# Patient Record
Sex: MALE | Race: WHITE | Employment: FULL TIME | URBAN - NONMETROPOLITAN AREA
[De-identification: names, ages, dates, MRNs, and addresses within clinical notes are randomized per-mention and may not be internally consistent; named-entity substitution may affect disease eponyms.]

---

## 2017-08-22 ENCOUNTER — HOSPITAL ENCOUNTER (OUTPATIENT)
Dept: GENERAL RADIOLOGY | Age: 37
Discharge: HOME | End: 2017-08-22
Payer: COMMERCIAL

## 2017-08-22 ENCOUNTER — OFFICE VISIT (OUTPATIENT)
Dept: PRIMARY CARE CLINIC | Age: 37
End: 2017-08-22
Payer: COMMERCIAL

## 2017-08-22 VITALS
HEIGHT: 69 IN | OXYGEN SATURATION: 98 % | BODY MASS INDEX: 28.22 KG/M2 | SYSTOLIC BLOOD PRESSURE: 166 MMHG | HEART RATE: 66 BPM | DIASTOLIC BLOOD PRESSURE: 96 MMHG | TEMPERATURE: 99.6 F | WEIGHT: 190.5 LBS

## 2017-08-22 DIAGNOSIS — T07.XXXA MULTIPLE ABRASIONS: ICD-10-CM

## 2017-08-22 DIAGNOSIS — M25.519 SHOULDER PAIN, UNSPECIFIED CHRONICITY, UNSPECIFIED LATERALITY: Primary | ICD-10-CM

## 2017-08-22 DIAGNOSIS — R03.0 ELEVATED SYSTOLIC BLOOD PRESSURE READING WITHOUT DIAGNOSIS OF HYPERTENSION: ICD-10-CM

## 2017-08-22 DIAGNOSIS — M25.519 SHOULDER PAIN, UNSPECIFIED CHRONICITY, UNSPECIFIED LATERALITY: ICD-10-CM

## 2017-08-22 PROCEDURE — 99213 OFFICE O/P EST LOW 20 MIN: CPT | Performed by: NURSE PRACTITIONER

## 2017-08-22 PROCEDURE — 73030 X-RAY EXAM OF SHOULDER: CPT

## 2017-08-22 ASSESSMENT — ENCOUNTER SYMPTOMS
SORE THROAT: 0
DIARRHEA: 0
TROUBLE SWALLOWING: 0
RHINORRHEA: 0
VOMITING: 0
COUGH: 0
ABDOMINAL PAIN: 0
SHORTNESS OF BREATH: 0
CONSTIPATION: 0
NAUSEA: 0

## 2018-05-22 ENCOUNTER — OFFICE VISIT (OUTPATIENT)
Dept: PRIMARY CARE CLINIC | Age: 38
End: 2018-05-22
Payer: COMMERCIAL

## 2018-05-22 VITALS
SYSTOLIC BLOOD PRESSURE: 146 MMHG | HEART RATE: 61 BPM | WEIGHT: 210 LBS | BODY MASS INDEX: 31.83 KG/M2 | HEIGHT: 68 IN | OXYGEN SATURATION: 98 % | DIASTOLIC BLOOD PRESSURE: 94 MMHG | TEMPERATURE: 98.8 F

## 2018-05-22 DIAGNOSIS — Z00.00 ROUTINE PHYSICAL EXAMINATION: Primary | ICD-10-CM

## 2018-05-22 DIAGNOSIS — R53.83 FATIGUE, UNSPECIFIED TYPE: ICD-10-CM

## 2018-05-22 PROCEDURE — 99395 PREV VISIT EST AGE 18-39: CPT | Performed by: NURSE PRACTITIONER

## 2018-05-22 RX ORDER — CETIRIZINE HYDROCHLORIDE 10 MG/1
10 TABLET ORAL DAILY
COMMUNITY
End: 2020-11-11

## 2018-05-22 ASSESSMENT — ENCOUNTER SYMPTOMS
ABDOMINAL PAIN: 0
COUGH: 0
DIARRHEA: 0
RHINORRHEA: 0
SORE THROAT: 0
VOMITING: 0
CONSTIPATION: 0
SHORTNESS OF BREATH: 0
TROUBLE SWALLOWING: 0
NAUSEA: 0

## 2018-05-22 ASSESSMENT — PATIENT HEALTH QUESTIONNAIRE - PHQ9
SUM OF ALL RESPONSES TO PHQ9 QUESTIONS 1 & 2: 1
2. FEELING DOWN, DEPRESSED OR HOPELESS: 0
1. LITTLE INTEREST OR PLEASURE IN DOING THINGS: 1
SUM OF ALL RESPONSES TO PHQ QUESTIONS 1-9: 1

## 2018-05-25 DIAGNOSIS — Z00.00 ROUTINE PHYSICAL EXAMINATION: ICD-10-CM

## 2018-05-25 DIAGNOSIS — R53.83 FATIGUE, UNSPECIFIED TYPE: ICD-10-CM

## 2018-05-25 LAB
ALBUMIN SERPL-MCNC: 4.4 G/DL (ref 3.5–5.2)
ALP BLD-CCNC: 81 U/L (ref 40–130)
ALT SERPL-CCNC: 22 U/L (ref 5–41)
ANION GAP SERPL CALCULATED.3IONS-SCNC: 12 MMOL/L (ref 7–19)
AST SERPL-CCNC: 21 U/L (ref 5–40)
BASOPHILS ABSOLUTE: 0.1 K/UL (ref 0–0.2)
BASOPHILS RELATIVE PERCENT: 1.6 % (ref 0–1)
BILIRUB SERPL-MCNC: 0.4 MG/DL (ref 0.2–1.2)
BUN BLDV-MCNC: 15 MG/DL (ref 6–20)
CALCIUM SERPL-MCNC: 8.9 MG/DL (ref 8.6–10)
CHLORIDE BLD-SCNC: 104 MMOL/L (ref 98–111)
CHOLESTEROL, TOTAL: 167 MG/DL (ref 160–199)
CO2: 24 MMOL/L (ref 22–29)
CREAT SERPL-MCNC: 1 MG/DL (ref 0.5–1.2)
EOSINOPHILS ABSOLUTE: 0.6 K/UL (ref 0–0.6)
EOSINOPHILS RELATIVE PERCENT: 12.5 % (ref 0–5)
GFR NON-AFRICAN AMERICAN: >60
GLUCOSE BLD-MCNC: 102 MG/DL (ref 74–109)
HCT VFR BLD CALC: 41.9 % (ref 42–52)
HDLC SERPL-MCNC: 46 MG/DL (ref 55–121)
HEMOGLOBIN: 14.3 G/DL (ref 14–18)
LDL CHOLESTEROL CALCULATED: 103 MG/DL
LYMPHOCYTES ABSOLUTE: 2.1 K/UL (ref 1.1–4.5)
LYMPHOCYTES RELATIVE PERCENT: 40.2 % (ref 20–40)
MCH RBC QN AUTO: 32 PG (ref 27–31)
MCHC RBC AUTO-ENTMCNC: 34.1 G/DL (ref 33–37)
MCV RBC AUTO: 93.7 FL (ref 80–94)
MONOCYTES ABSOLUTE: 0.5 K/UL (ref 0–0.9)
MONOCYTES RELATIVE PERCENT: 9.4 % (ref 0–10)
NEUTROPHILS ABSOLUTE: 1.8 K/UL (ref 1.5–7.5)
NEUTROPHILS RELATIVE PERCENT: 36.1 % (ref 50–65)
PDW BLD-RTO: 12.7 % (ref 11.5–14.5)
PLATELET # BLD: 269 K/UL (ref 130–400)
PMV BLD AUTO: 10.7 FL (ref 9.4–12.4)
POTASSIUM SERPL-SCNC: 4.4 MMOL/L (ref 3.5–5)
RBC # BLD: 4.47 M/UL (ref 4.7–6.1)
SODIUM BLD-SCNC: 140 MMOL/L (ref 136–145)
T4 FREE: 1.3 NG/DL (ref 0.9–1.7)
TOTAL PROTEIN: 7.2 G/DL (ref 6.6–8.7)
TRIGL SERPL-MCNC: 90 MG/DL (ref 0–149)
TSH SERPL DL<=0.05 MIU/L-ACNC: 0.92 UIU/ML (ref 0.27–4.2)
WBC # BLD: 5.1 K/UL (ref 4.8–10.8)

## 2018-05-26 LAB
SEX HORMONE BINDING GLOBULIN: 40 NMOL/L (ref 11–80)
TESTOSTERONE FREE-NONMALE: 96.6 PG/ML (ref 47–244)
TESTOSTERONE TOTAL: 516 NG/DL (ref 220–1000)

## 2018-05-27 LAB — VITAMIN D 1,25-DIHYDROXY: 64.8 PG/ML (ref 19.9–79.3)

## 2018-10-12 ENCOUNTER — OFFICE VISIT (OUTPATIENT)
Dept: URGENT CARE | Age: 38
End: 2018-10-12
Payer: COMMERCIAL

## 2018-10-12 VITALS
DIASTOLIC BLOOD PRESSURE: 84 MMHG | TEMPERATURE: 97.8 F | BODY MASS INDEX: 32.23 KG/M2 | WEIGHT: 212 LBS | HEART RATE: 60 BPM | SYSTOLIC BLOOD PRESSURE: 132 MMHG | RESPIRATION RATE: 18 BRPM | OXYGEN SATURATION: 100 %

## 2018-10-12 DIAGNOSIS — J02.9 SORE THROAT: ICD-10-CM

## 2018-10-12 DIAGNOSIS — B08.4 HAND, FOOT AND MOUTH DISEASE: Primary | ICD-10-CM

## 2018-10-12 PROCEDURE — 99202 OFFICE O/P NEW SF 15 MIN: CPT | Performed by: SPECIALIST

## 2018-10-12 RX ORDER — AMOXICILLIN 500 MG/1
500 CAPSULE ORAL 3 TIMES DAILY
COMMUNITY
End: 2020-11-11

## 2018-10-12 ASSESSMENT — ENCOUNTER SYMPTOMS: SORE THROAT: 1

## 2018-10-12 NOTE — PROGRESS NOTES
Patient left without stopping, While I was attending to another patient. Did not collect copay for the visit.
and well-nourished. HENT:   Head: Normocephalic and atraumatic. Mouth/Throat: Uvula is midline and mucous membranes are normal. Posterior oropharyngeal erythema present. Tonsils are 2+ on the right. Tonsils are 2+ on the left. Cardiovascular: Normal rate, regular rhythm, S1 normal, S2 normal and normal heart sounds. Pulmonary/Chest: Effort normal and breath sounds normal.   Skin: Skin is warm and dry. Rash (hands) noted. Psychiatric: He has a normal mood and affect. His speech is normal and behavior is normal.   Nursing note and vitals reviewed. /84   Pulse 60   Temp 97.8 °F (36.6 °C) (Temporal)   Resp 18   Wt 212 lb (96.2 kg)   SpO2 100%   BMI 32.23 kg/m²     Assessment:       Diagnosis Orders   1. Hand, foot and mouth disease     2. Sore throat         Plan:    No orders of the defined types were placed in this encounter. No Follow-up on file. No orders of the defined types were placed in this encounter. No orders of the defined types were placed in this encounter. Patient given educationalmaterials - see patient instructions. Discussed use, benefit, and side effectsof prescribed medications. All patient questions answered. Pt voiced understanding. Reviewed health maintenance. Instructed to continue current medications, diet andexercise. Patient agreed with treatment plan. Follow up as directed. Patient Instructions       Patient Education        Stomatitis: Care Instructions  Your Care Instructions  Stomatitis is swelling and redness of the lining of your mouth. It can cause painful sores that can make it hard for you to eat, drink, or swallow. Stomatitis may be caused by a viral or bacterial infection, a disease, or not taking care of your teeth and gums properly. Other causes include reactions to smoking, burns from hot food or drinks, or an allergic reaction.  Allergy causes may be a result of flavorings such as spearmint or cinnamon that are used in

## 2018-10-12 NOTE — PATIENT INSTRUCTIONS
Patient Education        Stomatitis: Care Instructions  Your Care Instructions  Stomatitis is swelling and redness of the lining of your mouth. It can cause painful sores that can make it hard for you to eat, drink, or swallow. Stomatitis may be caused by a viral or bacterial infection, a disease, or not taking care of your teeth and gums properly. Other causes include reactions to smoking, burns from hot food or drinks, or an allergic reaction. Allergy causes may be a result of flavorings such as spearmint or cinnamon that are used in chewing gum, toothpaste, soft drinks, candies, and other products. Your doctor may prescribe pain medicines or special mouth rinses. He or she may tell you to quit using some products that may be causing the sores. It can take up to 2 weeks for the sores to heal.  Some people with stomatitis also get a yeast infection of the mouth, called thrush. Medicines can treat this problem. Follow-up care is a key part of your treatment and safety. Be sure to make and go to all appointments, and call your doctor if you are having problems. It's also a good idea to know your test results and keep a list of the medicines you take. How can you care for yourself at home? · Be safe with medicines. Read and follow all instructions on the label. ¨ If the doctor gave you a prescription medicine for pain, take it as prescribed. ¨ If you are not taking a prescription pain medicine, ask your doctor if you can take an over-the-counter medicine. · If your doctor prescribed antibiotics, take them as directed. Do not stop taking them just because you feel better. You need to take the full course of antibiotics. · Use prescription mouth rinses as prescribed. Ask your doctor if you can freeze the mouth rinse in an ice cube tray. Sucking on a frozen cube of the mouth rinse can help ease the pain. · Make a rinse to keep your mouth from getting dry.  Add 1 teaspoon baking soda and ½ teaspoon salt to a quart of water. Use it to rinse your mouth 4 to 6 times each day. Spit out the rinse. Do not swallow it. · Do not use a mouthwash or other over-the-counter rinse with alcohol. These can dry out your mouth or cause more pain. · If your doctor gave you mouthwash or lozenges to treat your yeast infection, use them as directed. · Eat soft foods such as mashed potatoes and other cooked vegetables, noodles, applesauce, clear broth soups, yogurt, and cottage cheese. You can get extra protein by adding protein powder to milk shakes or breakfast drinks. Avoid eating spicy or crunchy foods. · Try eating cold foods such as ice cream or yogurt. · Avoid drinking high-acid juices such as orange, grapefruit, and cranberry juices. · Drink plenty of fluids to prevent dehydration. Drinking through a straw may help with pain. · Practice good oral hygiene. Use a very soft toothbrush to brush your teeth at least two times a day. Or use your finger to brush your teeth if your mouth is sore. When should you call for help? Call your doctor now or seek immediate medical care if:    · You have new or worse symptoms of infection, such as:  ¨ Increased pain, swelling, warmth, or redness. ¨ Red streaks leading from the area. ¨ Pus draining from the area. ¨ A fever.    Watch closely for changes in your health, and be sure to contact your doctor if:    · You do not get better as expected. Where can you learn more? Go to https://[email or web address] [place]. org and sign in to your [employer] account. Enter B965 in the [facility] box to learn more about \"Stomatitis: Care Instructions. \"     If you do not have an account, please click on the \"Sign Up Now\" link. Current as of: May 12, 2017  Content Version: 11.7  © 5203-2025 [employer]. Care instructions adapted under license by [facility]  If you have questions about a medical condition or this instruction, always ask your healthcare professional.

## 2020-06-24 ENCOUNTER — TELEMEDICINE (OUTPATIENT)
Dept: PRIMARY CARE CLINIC | Age: 40
End: 2020-06-24
Payer: COMMERCIAL

## 2020-06-24 PROCEDURE — G0444 DEPRESSION SCREEN ANNUAL: HCPCS | Performed by: NURSE PRACTITIONER

## 2020-06-24 RX ORDER — LISINOPRIL 20 MG/1
20 TABLET ORAL DAILY
Qty: 30 TABLET | Refills: 11 | Status: SHIPPED | OUTPATIENT
Start: 2020-06-24 | End: 2020-07-23 | Stop reason: SDUPTHER

## 2020-06-24 RX ORDER — BUPROPION HYDROCHLORIDE 150 MG/1
150 TABLET ORAL EVERY MORNING
Qty: 30 TABLET | Refills: 11 | Status: SHIPPED | OUTPATIENT
Start: 2020-06-24 | End: 2020-07-23 | Stop reason: SDUPTHER

## 2020-06-24 ASSESSMENT — PATIENT HEALTH QUESTIONNAIRE - PHQ9
7. TROUBLE CONCENTRATING ON THINGS, SUCH AS READING THE NEWSPAPER OR WATCHING TELEVISION: 3
1. LITTLE INTEREST OR PLEASURE IN DOING THINGS: 3
2. FEELING DOWN, DEPRESSED OR HOPELESS: 1
3. TROUBLE FALLING OR STAYING ASLEEP: 0
10. IF YOU CHECKED OFF ANY PROBLEMS, HOW DIFFICULT HAVE THESE PROBLEMS MADE IT FOR YOU TO DO YOUR WORK, TAKE CARE OF THINGS AT HOME, OR GET ALONG WITH OTHER PEOPLE: 1
SUM OF ALL RESPONSES TO PHQ QUESTIONS 1-9: 15
SUM OF ALL RESPONSES TO PHQ9 QUESTIONS 1 & 2: 4
4. FEELING TIRED OR HAVING LITTLE ENERGY: 2
SUM OF ALL RESPONSES TO PHQ QUESTIONS 1-9: 15
8. MOVING OR SPEAKING SO SLOWLY THAT OTHER PEOPLE COULD HAVE NOTICED. OR THE OPPOSITE, BEING SO FIGETY OR RESTLESS THAT YOU HAVE BEEN MOVING AROUND A LOT MORE THAN USUAL: 0
6. FEELING BAD ABOUT YOURSELF - OR THAT YOU ARE A FAILURE OR HAVE LET YOURSELF OR YOUR FAMILY DOWN: 3
9. THOUGHTS THAT YOU WOULD BE BETTER OFF DEAD, OR OF HURTING YOURSELF: 0
5. POOR APPETITE OR OVEREATING: 3

## 2020-06-24 ASSESSMENT — ENCOUNTER SYMPTOMS
COUGH: 0
NAUSEA: 0
CONSTIPATION: 0
ABDOMINAL PAIN: 0
SORE THROAT: 0
TROUBLE SWALLOWING: 0
SHORTNESS OF BREATH: 0
DIARRHEA: 0
RHINORRHEA: 0
VOMITING: 0

## 2020-06-24 NOTE — PROGRESS NOTES
Virtual Visit was conducted with patient's (and/or legal guardian's) consent, to reduce the patient's risk of exposure to COVID-19 and provide necessary medical care. The patient (and/or legal guardian) has also been advised to contact this office for worsening conditions or problems, and seek emergency medical treatment and/or call 911 if deemed necessary. Patient identification was verified at the start of the visit: No    Total time spent on this encounter: Not billed by time    Services were provided through a video synchronous discussion virtually to substitute for in-person clinic visit. Patient and provider were located at their individual homes. --RODNEY Paige on 6/24/2020 at 3:31 PM    An electronic signature was used to authenticate this note.

## 2020-07-23 RX ORDER — BUPROPION HYDROCHLORIDE 150 MG/1
150 TABLET ORAL EVERY MORNING
Qty: 30 TABLET | Refills: 11 | Status: SHIPPED | OUTPATIENT
Start: 2020-07-23 | End: 2020-07-23 | Stop reason: SDUPTHER

## 2020-07-23 RX ORDER — LISINOPRIL 20 MG/1
20 TABLET ORAL DAILY
Qty: 30 TABLET | Refills: 11 | Status: SHIPPED | OUTPATIENT
Start: 2020-07-23 | End: 2020-08-05 | Stop reason: SDUPTHER

## 2020-07-23 RX ORDER — BUPROPION HYDROCHLORIDE 150 MG/1
150 TABLET ORAL EVERY MORNING
Qty: 30 TABLET | Refills: 11 | Status: SHIPPED | OUTPATIENT
Start: 2020-07-23 | End: 2020-08-05 | Stop reason: SDUPTHER

## 2020-07-23 RX ORDER — LISINOPRIL 20 MG/1
20 TABLET ORAL DAILY
Qty: 30 TABLET | Refills: 11 | Status: SHIPPED | OUTPATIENT
Start: 2020-07-23 | End: 2020-07-23 | Stop reason: SDUPTHER

## 2020-08-04 ENCOUNTER — VIRTUAL VISIT (OUTPATIENT)
Dept: PRIMARY CARE CLINIC | Age: 40
End: 2020-08-04
Payer: COMMERCIAL

## 2020-08-04 PROCEDURE — 99213 OFFICE O/P EST LOW 20 MIN: CPT | Performed by: NURSE PRACTITIONER

## 2020-08-04 ASSESSMENT — ENCOUNTER SYMPTOMS
COUGH: 0
ABDOMINAL PAIN: 0
VOMITING: 0
RHINORRHEA: 0
NAUSEA: 0
CONSTIPATION: 0
DIARRHEA: 0
SHORTNESS OF BREATH: 0
SORE THROAT: 0
TROUBLE SWALLOWING: 0

## 2020-08-04 NOTE — PROGRESS NOTES
2020    TELEHEALTH EVALUATION -- Audio/Visual (During AYATB-93 public health emergency)    HPI:    Lakisha Mckeon (:  1980) has requested an audio/video evaluation for the following concern(s):    Follow up on moods and blood pressure    Moods   Started on Wellbutrin last visit  Wife is very happy with the medication. Patient rates improvement 8 out of 10  Denies any unwanted side effects except some mild dizziness when he took it at the same time as his blood pressure medicine. He states he started taken 1 in the morning and the other at night and has been fine ever since. HTN  On lisinopril  Denies any headache or chest pain  BPs are have been running 110-130/60-70s    Review of Systems   Constitutional: Negative for activity change, appetite change, fatigue, fever and unexpected weight change. HENT: Negative for ear pain, rhinorrhea, sore throat and trouble swallowing. Eyes: Negative for visual disturbance. Respiratory: Negative for cough and shortness of breath. Cardiovascular: Negative for chest pain, palpitations and leg swelling. Gastrointestinal: Negative for abdominal pain, constipation, diarrhea, nausea and vomiting. Genitourinary: Negative for flank pain. Musculoskeletal: Negative for arthralgias, myalgias, neck pain and neck stiffness. Neurological: Negative for headaches. Psychiatric/Behavioral: Negative for decreased concentration and sleep disturbance. The patient is not nervous/anxious. Prior to Visit Medications    Medication Sig Taking?  Authorizing Provider   buPROPion (WELLBUTRIN XL) 150 MG extended release tablet Take 1 tablet by mouth every morning  RODNEY Galvin   lisinopril (PRINIVIL;ZESTRIL) 20 MG tablet Take 1 tablet by mouth daily  RODNEY Galvin   amoxicillin (AMOXIL) 500 MG capsule Take 500 mg by mouth 3 times daily  Historical Provider, MD   Multiple Vitamins-Minerals (MULTIVITAMIN ADULTS PO) Take 1 tablet by mouth daily  Historical Provider, MD   cetirizine (ZYRTEC) 10 MG tablet Take 10 mg by mouth daily  Historical Provider, MD       Social History     Tobacco Use    Smoking status: Former Smoker    Smokeless tobacco: Never Used   Substance Use Topics    Alcohol use: Yes     Comment: 1-2 beer weekly    Drug use: No        No Known Allergies, No past medical history on file., No past surgical history on file.,   Social History     Tobacco Use    Smoking status: Former Smoker    Smokeless tobacco: Never Used   Substance Use Topics    Alcohol use: Yes     Comment: 1-2 beer weekly    Drug use: No   ,   Family History   Problem Relation Age of Onset    Other Mother         myasthenia gravis    Heart Disease Father     High Blood Pressure Father     High Cholesterol Father     Hypertension Sister     Crohn's Disease Brother     Crohn's Disease Maternal Grandmother     Stroke Maternal Grandfather    ,   Immunization History   Administered Date(s) Administered    Influenza Virus Vaccine 10/17/2017, 10/26/2018, 11/10/2019   ,   Health Maintenance   Topic Date Due    Varicella vaccine (1 of 2 - 2-dose childhood series) 09/29/1983    HIV screen  09/29/1997    DTaP/Tdap/Td vaccine (1 - Tdap) 09/29/2001    Potassium monitoring  05/25/2019    Creatinine monitoring  05/25/2019    Flu vaccine (1) 09/01/2020    Hepatitis A vaccine  Aged Out    Hepatitis B vaccine  Aged Out    Hib vaccine  Aged Out    Meningococcal (ACWY) vaccine  Aged Out    Pneumococcal 0-64 years Vaccine  Aged Out       PHYSICAL EXAMINATION:  [ INSTRUCTIONS:  \"[x]\" Indicates a positive item  \"[]\" Indicates a negative item  -- DELETE ALL ITEMS NOT EXAMINED]  Vital Signs: (As obtained by patient/caregiver or practitioner observation)    Blood pressure-  Heart rate-    Respiratory rate-    Temperature-  Pulse oximetry-     Constitutional: [x] Appears well-developed and well-nourished [x] No apparent distress      [] Abnormal- Mental status  [x] Alert and awake  [x] Oriented to person/place/time [x]Able to follow commands      Eyes:  EOM    []  Normal  [] Abnormal-  Sclera  []  Normal  [] Abnormal -         Discharge []  None visible  [] Abnormal -    HENT:   [x] Normocephalic, atraumatic. [] Abnormal   [] Mouth/Throat: Mucous membranes are moist.     External Ears [x] Normal  [] Abnormal-     Neck: [x] No visualized mass     Pulmonary/Chest: [x] Respiratory effort normal.  [x] No visualized signs of difficulty breathing or respiratory distress        [] Abnormal-      Musculoskeletal:   [] Normal gait with no signs of ataxia         [x] Normal range of motion of neck        [] Abnormal-       Neurological:        [x] No Facial Asymmetry (Cranial nerve 7 motor function) (limited exam to video visit)          [] No gaze palsy        [] Abnormal-         Skin:        [x] No significant exanthematous lesions or discoloration noted on facial skin         [] Abnormal-            Psychiatric:       [x] Normal Affect [] No Hallucinations        [] Abnormal-     Other pertinent observable physical exam findings-     ASSESSMENT/PLAN:  1. Current moderate episode of major depressive disorder, unspecified whether recurrent ([facility] [address]  The current medical regimen is effective;  continue present plan and medications. 2. Essential hypertension  The current medical regimen is effective;  continue present plan and medications. - CBC Auto Differential; Future  - Comprehensive Metabolic Panel; Future  - Lipid Panel; Future  - TSH without Reflex; Future  - T4, Free; Future  - Microalbumin / Creatinine Urine Ratio; Future    3. Routine physical examination  Requested fasting labs prior to follow-up appointment  - CBC Auto Differential; Future  - Comprehensive Metabolic Panel; Future  - Lipid Panel; Future  - TSH without Reflex; Future  - T4, Free; Future  - Microalbumin / Creatinine Urine Ratio;  Future      Return in about 3 months (around

## 2020-08-05 RX ORDER — BUPROPION HYDROCHLORIDE 150 MG/1
150 TABLET ORAL EVERY MORNING
Qty: 90 TABLET | Refills: 3 | Status: SHIPPED | OUTPATIENT
Start: 2020-08-05 | End: 2021-09-21 | Stop reason: SDUPTHER

## 2020-08-05 RX ORDER — LISINOPRIL 20 MG/1
20 TABLET ORAL DAILY
Qty: 90 TABLET | Refills: 3 | Status: SHIPPED | OUTPATIENT
Start: 2020-08-05 | End: 2021-09-21 | Stop reason: SDUPTHER

## 2020-11-09 ENCOUNTER — TELEPHONE (OUTPATIENT)
Dept: PRIMARY CARE CLINIC | Age: 40
End: 2020-11-09

## 2020-11-09 DIAGNOSIS — I10 ESSENTIAL HYPERTENSION: ICD-10-CM

## 2020-11-09 DIAGNOSIS — Z00.00 ROUTINE PHYSICAL EXAMINATION: ICD-10-CM

## 2020-11-09 LAB
ALBUMIN SERPL-MCNC: 4.4 G/DL (ref 3.5–5.2)
ALP BLD-CCNC: 59 U/L (ref 40–130)
ALT SERPL-CCNC: 17 U/L (ref 5–41)
ANION GAP SERPL CALCULATED.3IONS-SCNC: 12 MMOL/L (ref 7–19)
AST SERPL-CCNC: 18 U/L (ref 5–40)
BASOPHILS ABSOLUTE: 0.1 K/UL (ref 0–0.2)
BASOPHILS RELATIVE PERCENT: 1.3 % (ref 0–1)
BILIRUB SERPL-MCNC: <0.2 MG/DL (ref 0.2–1.2)
BUN BLDV-MCNC: 7 MG/DL (ref 6–20)
CALCIUM SERPL-MCNC: 8.8 MG/DL (ref 8.6–10)
CHLORIDE BLD-SCNC: 107 MMOL/L (ref 98–111)
CHOLESTEROL, TOTAL: 151 MG/DL (ref 160–199)
CO2: 22 MMOL/L (ref 22–29)
CREAT SERPL-MCNC: 0.8 MG/DL (ref 0.5–1.2)
CREATININE URINE: 34 MG/DL (ref 4.2–622)
EOSINOPHILS ABSOLUTE: 0.4 K/UL (ref 0–0.6)
EOSINOPHILS RELATIVE PERCENT: 8 % (ref 0–5)
GFR AFRICAN AMERICAN: >59
GFR NON-AFRICAN AMERICAN: >60
GLUCOSE BLD-MCNC: 104 MG/DL (ref 74–109)
HCT VFR BLD CALC: 42.9 % (ref 42–52)
HDLC SERPL-MCNC: 54 MG/DL (ref 55–121)
HEMOGLOBIN: 14.1 G/DL (ref 14–18)
IMMATURE GRANULOCYTES #: 0 K/UL
LDL CHOLESTEROL CALCULATED: 83 MG/DL
LYMPHOCYTES ABSOLUTE: 2.4 K/UL (ref 1.1–4.5)
LYMPHOCYTES RELATIVE PERCENT: 45 % (ref 20–40)
MCH RBC QN AUTO: 31.8 PG (ref 27–31)
MCHC RBC AUTO-ENTMCNC: 32.9 G/DL (ref 33–37)
MCV RBC AUTO: 96.8 FL (ref 80–94)
MICROALBUMIN UR-MCNC: <1.2 MG/DL (ref 0–19)
MICROALBUMIN/CREAT UR-RTO: NORMAL MG/G
MONOCYTES ABSOLUTE: 0.6 K/UL (ref 0–0.9)
MONOCYTES RELATIVE PERCENT: 10.9 % (ref 0–10)
NEUTROPHILS ABSOLUTE: 1.8 K/UL (ref 1.5–7.5)
NEUTROPHILS RELATIVE PERCENT: 34.8 % (ref 50–65)
PDW BLD-RTO: 13 % (ref 11.5–14.5)
PLATELET # BLD: 279 K/UL (ref 130–400)
PMV BLD AUTO: 11.1 FL (ref 9.4–12.4)
POTASSIUM SERPL-SCNC: 4.1 MMOL/L (ref 3.5–5)
RBC # BLD: 4.43 M/UL (ref 4.7–6.1)
SODIUM BLD-SCNC: 141 MMOL/L (ref 136–145)
T4 FREE: 1.24 NG/DL (ref 0.93–1.7)
TOTAL PROTEIN: 6.8 G/DL (ref 6.6–8.7)
TRIGL SERPL-MCNC: 71 MG/DL (ref 0–149)
TSH SERPL DL<=0.05 MIU/L-ACNC: 1.15 UIU/ML (ref 0.27–4.2)
WBC # BLD: 5.2 K/UL (ref 4.8–10.8)

## 2020-11-09 NOTE — TELEPHONE ENCOUNTER
----- Message from RODNEY Anderson sent at 11/9/2020 12:49 PM CST -----  Please call patient and let them know results. No pathologic protein in urine.

## 2020-11-09 NOTE — TELEPHONE ENCOUNTER
----- Message from RODNEY García sent at 11/9/2020 12:49 PM CST -----  Please call patient and let them know results.    Normal thyroid

## 2020-11-09 NOTE — TELEPHONE ENCOUNTER
----- Message from RODNEY Frost sent at 11/9/2020 12:38 PM CST -----  Please call patient and let them know results. Normal cholesterol  Your metabolic profile is normal.  This includes kidney and liver functions as well as electrolytes.   Blood counts are normal

## 2020-11-11 ENCOUNTER — OFFICE VISIT (OUTPATIENT)
Dept: PRIMARY CARE CLINIC | Age: 40
End: 2020-11-11
Payer: COMMERCIAL

## 2020-11-11 VITALS
OXYGEN SATURATION: 98 % | BODY MASS INDEX: 29.03 KG/M2 | WEIGHT: 196 LBS | HEART RATE: 75 BPM | TEMPERATURE: 98.4 F | SYSTOLIC BLOOD PRESSURE: 126 MMHG | HEIGHT: 69 IN | DIASTOLIC BLOOD PRESSURE: 74 MMHG

## 2020-11-11 PROCEDURE — 99395 PREV VISIT EST AGE 18-39: CPT | Performed by: NURSE PRACTITIONER

## 2020-11-11 PROCEDURE — 90686 IIV4 VACC NO PRSV 0.5 ML IM: CPT | Performed by: NURSE PRACTITIONER

## 2020-11-11 PROCEDURE — 90471 IMMUNIZATION ADMIN: CPT | Performed by: NURSE PRACTITIONER

## 2020-11-11 ASSESSMENT — ENCOUNTER SYMPTOMS
TROUBLE SWALLOWING: 0
NAUSEA: 0
SHORTNESS OF BREATH: 0
DIARRHEA: 0
SORE THROAT: 0
COUGH: 0
VOMITING: 0
RHINORRHEA: 0
ABDOMINAL PAIN: 0
CONSTIPATION: 0

## 2020-11-11 NOTE — PROGRESS NOTES
[address], [address]  Phone [phone]   Fax [phone]      OFFICE VISIT: 11/11/2020    Coco Copeland is a 47 y.o. male whopresents today for his medical conditions/complaints as noted below. Coco Copeland isc/o of Annual Exam and Ear Problem (left ear itching)        : HPI  Here for yearly physical     [facility]: [facility] [place] in July   Dentist:  June ([address] [place] [address] in [place])  Skin:  No concerns  Labs:  done  PSA: n/a  Nocturia:  once rarely  Stream: no concerns  ED:  No concern  Colonoscopy:  No, no family history  Exercise: walk daily with dogs, run 2-3 days a week  Diet and Nut:  Regular   MVI:  yes  Supplements:  No   Asa: no  Depression:  no - controlled on medicine  Body Image: no  Fall:  no  EOL:  no  Tobacco: no   Substance: occassional, no illicits  Immunization:  needs flu   Sleep: no concerns  Cognition: good     Health Maintenance: needs annual flu vaccine    HTN  BP controlled on medicine 110-120/70s  No HA or CP    Depression  Moods controlled on medicine.      Left Ear itches  Tried alcohol    Lab Review   Orders Only on 11/09/2020   Component Date Value    Cholesterol, Total 11/09/2020 151*    Triglycerides 11/09/2020 71     HDL 11/09/2020 54*    LDL Calculated 11/09/2020 83     Sodium 11/09/2020 141     Potassium 11/09/2020 4.1     Chloride 11/09/2020 107     CO2 11/09/2020 22     Anion Gap 11/09/2020 12     Glucose 11/09/2020 104     BUN 11/09/2020 7     CREATININE 11/09/2020 0.8     GFR Non- 11/09/2020 >60     GFR  11/09/2020 >59     Calcium 11/09/2020 8.8     Total Protein 11/09/2020 6.8     Alb 11/09/2020 4.4     Total Bilirubin 11/09/2020 <0.2     Alkaline Phosphatase 11/09/2020 59     ALT 11/09/2020 17     AST 11/09/2020 18     WBC 11/09/2020 5.2     RBC 11/09/2020 4.43*    Hemoglobin 11/09/2020 14.1     Hematocrit 11/09/2020 42.9     MCV 11/09/2020 96.8*    MCH 11/09/2020 31.8*  MCHC 11/09/2020 32.9*    RDW 11/09/2020 13.0     Platelets 62/91/9278 279     MPV 11/09/2020 11.1     Neutrophils % 11/09/2020 34.8*    Lymphocytes % 11/09/2020 45.0*    Monocytes % 11/09/2020 10.9*    Eosinophils % 11/09/2020 8.0*    Basophils % 11/09/2020 1.3*    Neutrophils Absolute 11/09/2020 1.8     Immature Granulocytes # 11/09/2020 0.0     Lymphocytes Absolute 11/09/2020 2.4     Monocytes Absolute 11/09/2020 0.60     Eosinophils Absolute 11/09/2020 0.40     Basophils Absolute 11/09/2020 0.10     Microalbumin, Random Uri* 11/09/2020 <1.20     Creatinine, Ur 11/09/2020 34.0     Microalbumin Creatinine * 11/09/2020 see below     T4 Free 11/09/2020 1.24     TSH 11/09/2020 1.150      No past medical history on file. No past surgical history on file. Family History   Problem Relation Age of Onset    Other Mother         myasthenia gravis    Heart Disease Father     High Blood Pressure Father     High Cholesterol Father     Hypertension Sister     Crohn's Disease Brother     Crohn's Disease Maternal Grandmother     Stroke Maternal Grandfather        Social History     Tobacco Use    Smoking status: Former Smoker    Smokeless tobacco: Never Used   Substance Use Topics    Alcohol use: Yes     Comment: 1-2 beer weekly      Current Outpatient Medications   Medication Sig Dispense Refill    lisinopril (PRINIVIL;ZESTRIL) 20 MG tablet Take 1 tablet by mouth daily 90 tablet 3    buPROPion (WELLBUTRIN XL) 150 MG extended release tablet Take 1 tablet by mouth every morning 90 tablet 3    Multiple Vitamins-Minerals (MULTIVITAMIN ADULTS PO) Take 1 tablet by mouth daily       No current facility-administered medications for this visit.       No Known Allergies    Health Maintenance   Topic Date Due    Varicella vaccine (1 of 2 - 2-dose childhood series) 09/29/1983    HIV screen  09/29/1997    DTaP/Tdap/Td vaccine (1 - Tdap) 09/29/2001    Flu vaccine (1) 09/01/2020    Potassium monitoring  11/09/2021    Creatinine monitoring  11/09/2021    Hepatitis A vaccine  Aged Out    Hepatitis B vaccine  Aged Out    Hib vaccine  Aged Out    Meningococcal (ACWY) vaccine  Aged Out    Pneumococcal 0-64 years Vaccine  Aged Out        :     Review of Systems   Constitutional: Negative for activity change, appetite change, fatigue, fever and unexpected weight change. HENT: Negative for ear pain, rhinorrhea, sore throat and trouble swallowing. Eyes: Negative for visual disturbance. Respiratory: Negative for cough and shortness of breath. Cardiovascular: Negative for chest pain, palpitations and leg swelling. Gastrointestinal: Negative for abdominal pain, constipation, diarrhea, nausea and vomiting. Genitourinary: Negative for flank pain. Musculoskeletal: Negative for arthralgias, myalgias, neck pain and neck stiffness. Neurological: Negative for headaches. Psychiatric/Behavioral: Negative for decreased concentration and sleep disturbance. The patient is not nervous/anxious.        :     Physical Exam  Vitals signs reviewed. Constitutional:       Appearance: Normal appearance. He is well-developed. HENT:      Head: Normocephalic and atraumatic. Right Ear: Tympanic membrane, ear canal and external ear normal.      Left Ear: Tympanic membrane, ear canal and external ear normal.      Nose: Nose normal.      Mouth/Throat:      Mouth: Mucous membranes are moist.      Pharynx: Oropharynx is clear. Eyes:      General: No scleral icterus. Conjunctiva/sclera: Conjunctivae normal.      Pupils: Pupils are equal, round, and reactive to light. Neck:      Musculoskeletal: Normal range of motion and neck supple. No edema. Thyroid: No thyroid mass or thyromegaly. Vascular: No carotid bruit. Cardiovascular:      Rate and Rhythm: Normal rate and regular rhythm. Heart sounds: Normal heart sounds. No murmur.    Pulmonary:      Effort: Pulmonary effort is normal. Breath sounds: Normal breath sounds. Abdominal:      General: Bowel sounds are normal. There is no distension. Palpations: Abdomen is soft. Tenderness: There is no abdominal tenderness. There is no guarding or rebound. Genitourinary:     Comments: Exam deferred  Musculoskeletal: Normal range of motion. Comments: Joints without swelling or redness   Skin:     General: Skin is warm and dry. Findings: No rash. Neurological:      Mental Status: He is alert and oriented to person, place, and time. GCS: GCS eye subscore is 4. GCS verbal subscore is 5. GCS motor subscore is 6. Cranial Nerves: Cranial nerves are intact. Sensory: Sensation is intact. Motor: Motor function is intact. Coordination: Coordination normal.      Gait: Gait is intact. Psychiatric:         Mood and Affect: Mood normal.         Speech: Speech normal.         Behavior: Behavior normal.         Thought Content: Thought content normal.         Judgment: Judgment normal.       /74   Pulse 75   Temp 98.4 °F (36.9 °C) (Temporal)   Ht 5' 9\" (1.753 m)   Wt 196 lb (88.9 kg)   SpO2 98%   BMI 28.94 kg/m²     :        ICD-10-CM    1. Routine physical examination  Z00.00 INFLUENZA, QUADV, 3 YRS AND OLDER, IM PF, PREFILL SYR OR SDV, 0.5ML (AFLURIA QUADV, PF)   2. Essential hypertension  I10    3. Current moderate episode of major depressive disorder, unspecified whether recurrent ([facility]  F32.1    4. Need for influenza vaccination  Z23 INFLUENZA, QUADV, 3 YRS AND OLDER, IM PF, PREFILL SYR OR SDV, 0.5ML (AFLURIA QUADV, PF)   5. Ear itching  L29.9        :      Return in about 1 year (around 11/11/2021). Orders Placed This Encounter   Procedures    INFLUENZA, QUADV, 3 YRS AND OLDER, IM PF, PREFILL SYR OR SDV, 0.5ML (AFLURIA QUADV, PF)     No orders of the defined types were placed in this encounter. Discussed use, benefit, and side effectsof prescribed medications.   All patient questions answered. Pt voiced understanding. Reviewed health maintenance. .  Patient agreed with treatment plan. Follow up asdirected. Patient Instructions     Patient Education   OTC hydrocortisone cream to ear canal twice a day for 2-3 days. If no improvement in itching follow up. Well Visit, Ages 25 to 48: Care Instructions  Your Care Instructions     Physical exams can help you stay healthy. Your doctor has checked your overall health and may have suggested ways to take good care of yourself. He or she also may have recommended tests. At home, you can help prevent illness with healthy eating, regular exercise, and other steps. Follow-up care is a key part of your treatment and safety. Be sure to make and go to all appointments, and call your doctor if you are having problems. It's also a good idea to know your test results and keep a list of the medicines you take. How can you care for yourself at home? · Reach and stay at a healthy weight. This will lower your risk for many problems, such as obesity, diabetes, heart disease, and high blood pressure. · Get at least 30 minutes of physical activity on most days of the week. Walking is a good choice. You also may want to do other activities, such as running, swimming, cycling, or playing tennis or team sports. Discuss any changes in your exercise program with your doctor. · Do not smoke or allow others to smoke around you. If you need help quitting, talk to your doctor about stop-smoking programs and medicines. These can increase your chances of quitting for good. · Talk to your doctor about whether you have any risk factors for sexually transmitted infections (STIs). Having one sex partner (who does not have STIs and does not have sex with anyone else) is a good way to avoid these infections. · Use birth control if you do not want to have children at this time.  Talk with your doctor about the choices available and what might be best for doctor about how often to have testing. · Tests for sexually transmitted infections (STIs). Ask whether you should have tests for STIs. You may be at risk if you have sex with more than one person, especially if your partners do not wear condoms. For men  · Tests for sexually transmitted infections (STIs). Ask whether you should have tests for STIs. You may be at risk if you have sex with more than one person, especially if you do not wear a condom. · Testicular cancer exam. Ask your doctor whether you should check your testicles regularly. · Prostate exam. Talk to your doctor about whether you should have a blood test (called a PSA test) for prostate cancer. Experts differ on whether and when men should have this test. Some experts suggest it if you are older than 39 and are -American or have a father or brother who got prostate cancer when he was younger than 72. When should you call for help? Watch closely for changes in your health, and be sure to contact your doctor if you have any problems or symptoms that concern you. Where can you learn more? Go to https://"[email or web address] Industri.es. org and sign in to your m2p-labs account. Enter P072 in the MogiMe box to learn more about \"Well Visit, Ages 25 to 48: Care Instructions. \"     If you do not have an account, please click on the \"Sign Up Now\" link. Current as of: May 27, 2020               Content Version: 12.6  © 3022-6491 [employer]. Care instructions adapted under license by [facility] ([facility]). If you have questions about a medical condition or this instruction, always ask your healthcare professional. [place] [address] any warranty or liability for your use of this information. No flowsheet data found.     Electronically signed by RODNEY Moore on11/11/2020 at 3:53 PM

## 2020-11-11 NOTE — PATIENT INSTRUCTIONS
sunglasses that block UV rays. Even when it's cloudy, put broad-spectrum sunscreen (SPF 30 or higher) on any exposed skin. · See a dentist one or two times a year for checkups and to have your teeth cleaned. · Wear a seat belt in the car. Follow your doctor's advice about when to have certain tests. These tests can spot problems early. For everyone  · Cholesterol. Have the fat (cholesterol) in your blood tested after age 21. Your doctor will tell you how often to have this done based on your age, family history, or other things that can increase your risk for heart disease. · Blood pressure. Have your blood pressure checked during a routine doctor visit. Your doctor will tell you how often to check your blood pressure based on your age, your blood pressure results, and other factors. · Vision. Talk with your doctor about how often to have a glaucoma test.  · Diabetes. Ask your doctor whether you should have tests for diabetes. · Colon cancer. Your risk for colorectal cancer gets higher as you get older. Some experts say that adults should start regular screening at age 48 and stop at age 76. Others say to start before age 48 or continue after age 76. Talk with your doctor about your risk and when to start and stop screening. For women  · Breast exam and mammogram. Talk to your doctor about when you should have a clinical breast exam and a mammogram. Medical experts differ on whether and how often women under 50 should have these tests. Your doctor can help you decide what is right for you. · Cervical cancer screening test and pelvic exam. Begin with a Pap test at age 24. The test often is part of a pelvic exam. Starting at age 27, you may choose to have a Pap test, an HPV test, or both tests at the same time (called co-testing). Talk with your doctor about how often to have testing. · Tests for sexually transmitted infections (STIs). Ask whether you should have tests for STIs.  You may be at risk if you have sex with more than one person, especially if your partners do not wear condoms. For men  · Tests for sexually transmitted infections (STIs). Ask whether you should have tests for STIs. You may be at risk if you have sex with more than one person, especially if you do not wear a condom. · Testicular cancer exam. Ask your doctor whether you should check your testicles regularly. · Prostate exam. Talk to your doctor about whether you should have a blood test (called a PSA test) for prostate cancer. Experts differ on whether and when men should have this test. Some experts suggest it if you are older than 39 and are -American or have a father or brother who got prostate cancer when he was younger than 72. When should you call for help? Watch closely for changes in your health, and be sure to contact your doctor if you have any problems or symptoms that concern you. Where can you learn more? Go to https://[employer] A[employer]peemilyeb.healthGreen Biofactory. org and sign in to your [employer] account. Enter P072 in the [employer] box to learn more about \"Well Visit, Ages 25 to 48: Care Instructions. \"     If you do not have an account, please click on the \"Sign Up Now\" link. Current as of: May 27, 2020               Content Version: 12.6  © 2006-2020 [employer]. Care instructions adapted under license by [facility] ([facility]). If you have questions about a medical condition or this instruction, always ask your healthcare professional. [place] [address] any warranty or liability for your use of this information.

## 2020-11-11 NOTE — PROGRESS NOTES
Vaccine Information Sheet, \"Influenza - Inactivated\"  given to Debbie Rowan, or parent/legal guardian of  Debbie Rowan and verbalized understanding. Patient responses:    Have you ever had a reaction to a flu vaccine? No  Are you able to eat eggs without adverse effects? Yes  Do you have any current illness? No  Have you ever had [place] Syndrome? No    Flu vaccine given per order. Please see immunization tab.

## 2021-02-22 NOTE — TELEPHONE ENCOUNTER
Called patient, spoke with: Patient regarding the results of the patients most recent labs. I advised Patient of Tommy Watts recommendations.    Patient did voice understanding Patient has chronic lower back pain that is stable. Patient uses walker.

## 2021-09-17 ENCOUNTER — OFFICE VISIT (OUTPATIENT)
Dept: PRIMARY CARE CLINIC | Age: 41
End: 2021-09-17

## 2021-09-17 VITALS — HEIGHT: 69 IN | WEIGHT: 198 LBS | BODY MASS INDEX: 29.33 KG/M2

## 2021-09-17 DIAGNOSIS — Z11.52 ENCOUNTER FOR SCREENING FOR COVID-19: Primary | ICD-10-CM

## 2021-09-17 LAB — SARS-COV-2, PCR: DETECTED

## 2021-09-17 PROCEDURE — 99999 PR OFFICE/OUTPT VISIT,PROCEDURE ONLY: CPT | Performed by: NURSE PRACTITIONER

## 2021-09-21 DIAGNOSIS — I10 ESSENTIAL HYPERTENSION: ICD-10-CM

## 2021-09-21 DIAGNOSIS — F32.1 CURRENT MODERATE EPISODE OF MAJOR DEPRESSIVE DISORDER, UNSPECIFIED WHETHER RECURRENT (HCC): ICD-10-CM

## 2021-09-21 RX ORDER — LISINOPRIL 20 MG/1
20 TABLET ORAL DAILY
Qty: 90 TABLET | Refills: 3 | Status: SHIPPED | OUTPATIENT
Start: 2021-09-21 | End: 2022-01-21 | Stop reason: SDUPTHER

## 2021-09-21 RX ORDER — BUPROPION HYDROCHLORIDE 150 MG/1
150 TABLET ORAL EVERY MORNING
Qty: 90 TABLET | Refills: 3 | Status: SHIPPED | OUTPATIENT
Start: 2021-09-21

## 2021-09-21 NOTE — TELEPHONE ENCOUNTER
Received call/My Chart Message from patient requesting refill on medication(s). Pt was last seen in office on 11/11/2020  and has a follow up scheduled for Visit date not found. Will send request to provider for authorization.      Requested Prescriptions     Pending Prescriptions Disp Refills    lisinopril (PRINIVIL;ZESTRIL) 20 MG tablet 90 tablet 3     Sig: Take 1 tablet by mouth daily    buPROPion (WELLBUTRIN XL) 150 MG extended release tablet 90 tablet 3     Sig: Take 1 tablet by mouth every morning

## 2021-09-22 RX ORDER — LISINOPRIL 20 MG/1
20 TABLET ORAL DAILY
Qty: 90 TABLET | Refills: 3 | OUTPATIENT
Start: 2021-09-22

## 2021-09-22 RX ORDER — BUPROPION HYDROCHLORIDE 150 MG/1
TABLET ORAL
Qty: 90 TABLET | Refills: 3 | OUTPATIENT
Start: 2021-09-22

## 2021-09-22 NOTE — TELEPHONE ENCOUNTER
Received fax from pharmacy requesting refill on pts medication(s). Pt was last seen in office on 11/11/2020  and has a follow up scheduled for Visit date not found. These were sent to pharmacy yesterday.     Requested Prescriptions     Refused Prescriptions Disp Refills    buPROPion (WELLBUTRIN XL) 150 MG extended release tablet [Pharmacy Med Name: BUPROPION HCL ER (XL) 150MG TB24] 90 tablet 3     Sig: TAKE 1 TABLET BY MOUTH ONE TIME A DAY IN THE MORNING     Refused By: Tana Flores     Reason for Refusal: Request already responded to by other means (e.g. phone or fax)    lisinopril (PRINIVIL;ZESTRIL) 20 MG tablet [Pharmacy Med Name: LISINOPRIL 20MG TABS] 90 tablet 3     Sig: Take 1 tablet by mouth daily     Refused By: Tana Flores     Reason for Refusal: Request already responded to by other means (e.g. phone or fax)

## 2021-10-01 ENCOUNTER — OFFICE VISIT (OUTPATIENT)
Dept: PRIMARY CARE CLINIC | Age: 41
End: 2021-10-01

## 2021-10-01 DIAGNOSIS — Z11.52 ENCOUNTER FOR SCREENING FOR COVID-19: Primary | ICD-10-CM

## 2021-10-01 LAB — SARS-COV-2, PCR: NOT DETECTED

## 2021-10-01 PROCEDURE — 99999 PR OFFICE/OUTPT VISIT,PROCEDURE ONLY: CPT | Performed by: NURSE PRACTITIONER

## 2022-01-21 DIAGNOSIS — I10 ESSENTIAL HYPERTENSION: ICD-10-CM

## 2022-01-21 RX ORDER — LISINOPRIL 20 MG/1
20 TABLET ORAL DAILY
Qty: 90 TABLET | Refills: 3 | Status: SHIPPED | OUTPATIENT
Start: 2022-01-21

## 2022-01-21 NOTE — TELEPHONE ENCOUNTER
Received fax from pharmacy requesting refill on pts medication(s). Pt was last seen in office on 11/11/2020  and has a follow up scheduled for Visit date not found. Will send request to  Maki Blackmon  for authorization.      Requested Prescriptions     Pending Prescriptions Disp Refills    lisinopril (PRINIVIL;ZESTRIL) 20 MG tablet 90 tablet 3     Sig: Take 1 tablet by mouth daily

## 2023-01-11 DIAGNOSIS — I10 ESSENTIAL HYPERTENSION: ICD-10-CM

## 2023-01-11 RX ORDER — LISINOPRIL 20 MG/1
TABLET ORAL
Qty: 90 TABLET | Refills: 3 | Status: SHIPPED | OUTPATIENT
Start: 2023-01-11

## 2023-01-11 NOTE — TELEPHONE ENCOUNTER
Tried calling patient to schedule an appointment. Call went straight to [facility]. Will send to provider but patient will need appointment, has no been seen since 11/11/2020. Received fax from pharmacy requesting refill on pts medication(s). Pt was last seen in office on 11/11/2020  and has a follow up scheduled for Visit date not found. Will send request to  Pedro Landrum  for authorization.      Requested Prescriptions     Pending Prescriptions Disp Refills    lisinopril (PRINIVIL;ZESTRIL) 20 MG tablet [Pharmacy Med Name: LISINOPRIL TAB 20MG] 90 tablet 3     Sig: TAKE 1 TABLET DAILY

## 2023-02-13 ENCOUNTER — OFFICE VISIT (OUTPATIENT)
Dept: FAMILY MEDICINE CLINIC | Age: 43
End: 2023-02-13
Payer: COMMERCIAL

## 2023-02-13 VITALS
DIASTOLIC BLOOD PRESSURE: 84 MMHG | TEMPERATURE: 97 F | BODY MASS INDEX: 38.66 KG/M2 | HEIGHT: 69 IN | SYSTOLIC BLOOD PRESSURE: 134 MMHG | HEART RATE: 92 BPM | OXYGEN SATURATION: 98 % | WEIGHT: 261 LBS

## 2023-02-13 DIAGNOSIS — I10 PRIMARY HYPERTENSION: ICD-10-CM

## 2023-02-13 DIAGNOSIS — Z13.31 POSITIVE DEPRESSION SCREENING: ICD-10-CM

## 2023-02-13 DIAGNOSIS — Z00.00 ROUTINE PHYSICAL EXAMINATION: Primary | ICD-10-CM

## 2023-02-13 DIAGNOSIS — F33.0 MILD EPISODE OF RECURRENT MAJOR DEPRESSIVE DISORDER (HCC): ICD-10-CM

## 2023-02-13 DIAGNOSIS — E66.09 CLASS 2 OBESITY DUE TO EXCESS CALORIES WITHOUT SERIOUS COMORBIDITY WITH BODY MASS INDEX (BMI) OF 38.0 TO 38.9 IN ADULT: ICD-10-CM

## 2023-02-13 PROCEDURE — 99396 PREV VISIT EST AGE 40-64: CPT | Performed by: NURSE PRACTITIONER

## 2023-02-13 PROCEDURE — 3079F DIAST BP 80-89 MM HG: CPT | Performed by: NURSE PRACTITIONER

## 2023-02-13 PROCEDURE — 3075F SYST BP GE 130 - 139MM HG: CPT | Performed by: NURSE PRACTITIONER

## 2023-02-13 RX ORDER — FLUOXETINE HYDROCHLORIDE 20 MG/1
20 CAPSULE ORAL DAILY
Qty: 30 CAPSULE | Refills: 11 | Status: SHIPPED | OUTPATIENT
Start: 2023-02-13

## 2023-02-13 SDOH — ECONOMIC STABILITY: HOUSING INSECURITY
IN THE LAST 12 MONTHS, WAS THERE A TIME WHEN YOU DID NOT HAVE A STEADY PLACE TO SLEEP OR SLEPT IN A SHELTER (INCLUDING NOW)?: NO

## 2023-02-13 SDOH — ECONOMIC STABILITY: INCOME INSECURITY: HOW HARD IS IT FOR YOU TO PAY FOR THE VERY BASICS LIKE FOOD, HOUSING, MEDICAL CARE, AND HEATING?: NOT HARD AT ALL

## 2023-02-13 SDOH — ECONOMIC STABILITY: FOOD INSECURITY: WITHIN THE PAST 12 MONTHS, YOU WORRIED THAT YOUR FOOD WOULD RUN OUT BEFORE YOU GOT MONEY TO BUY MORE.: NEVER TRUE

## 2023-02-13 SDOH — ECONOMIC STABILITY: FOOD INSECURITY: WITHIN THE PAST 12 MONTHS, THE FOOD YOU BOUGHT JUST DIDN'T LAST AND YOU DIDN'T HAVE MONEY TO GET MORE.: NEVER TRUE

## 2023-02-13 ASSESSMENT — PATIENT HEALTH QUESTIONNAIRE - PHQ9
1. LITTLE INTEREST OR PLEASURE IN DOING THINGS: 3
6. FEELING BAD ABOUT YOURSELF - OR THAT YOU ARE A FAILURE OR HAVE LET YOURSELF OR YOUR FAMILY DOWN: 2
4. FEELING TIRED OR HAVING LITTLE ENERGY: 0
SUM OF ALL RESPONSES TO PHQ QUESTIONS 1-9: 10
3. TROUBLE FALLING OR STAYING ASLEEP: 0
7. TROUBLE CONCENTRATING ON THINGS, SUCH AS READING THE NEWSPAPER OR WATCHING TELEVISION: 2
2. FEELING DOWN, DEPRESSED OR HOPELESS: 2
5. POOR APPETITE OR OVEREATING: 1
SUM OF ALL RESPONSES TO PHQ QUESTIONS 1-9: 10
10. IF YOU CHECKED OFF ANY PROBLEMS, HOW DIFFICULT HAVE THESE PROBLEMS MADE IT FOR YOU TO DO YOUR WORK, TAKE CARE OF THINGS AT HOME, OR GET ALONG WITH OTHER PEOPLE: 2
9. THOUGHTS THAT YOU WOULD BE BETTER OFF DEAD, OR OF HURTING YOURSELF: 0
8. MOVING OR SPEAKING SO SLOWLY THAT OTHER PEOPLE COULD HAVE NOTICED. OR THE OPPOSITE, BEING SO FIGETY OR RESTLESS THAT YOU HAVE BEEN MOVING AROUND A LOT MORE THAN USUAL: 0
SUM OF ALL RESPONSES TO PHQ QUESTIONS 1-9: 10
SUM OF ALL RESPONSES TO PHQ9 QUESTIONS 1 & 2: 5
SUM OF ALL RESPONSES TO PHQ QUESTIONS 1-9: 10

## 2023-02-13 ASSESSMENT — ENCOUNTER SYMPTOMS
SHORTNESS OF BREATH: 0
RHINORRHEA: 0
DIARRHEA: 0
TROUBLE SWALLOWING: 0
CONSTIPATION: 0
VOMITING: 0
SORE THROAT: 0
COUGH: 0
NAUSEA: 0
ABDOMINAL PAIN: 0

## 2023-02-13 NOTE — PROGRESS NOTES
2023    Rizwana Reasons (:  1980) is a 38 y.o. male, here for a preventive medicine evaluation. Eyes: July   Dentist:  routinely  Skin:  no concerns  Labs:  due  PSA: no family hx of prostate cancer  Nocturia:  one  Stream: no concerns  ED:  no concerns  Colonoscopy:  no family hx of colon cancer  Exercise: walk 3 miles a day  Diet and Nut:   eat bad  MVI:  none  Supplements:  none  Asa: none  Depression:  positive screening   Body Image: weight gain  Fall:  none  EOL:  none  Tobacco: none   Substance: occasional alcohol, no illicits  Immunization:  yearly flu, covid booster  Sleep: good  Cognition: good    Health Maintenance: needs yearly flu, covid booster, fasting labs     Hypertension:    Medications:   Lisinopril   Home blood pressure monitoring: No.  He is not adherent to a low sodium diet. Patient denies chest pain, shortness of breath, and headache. Antihypertensive medication side effects: no medication side effects noted. Use of agents associated with hypertension: none. Sodium (mmol/L)   Date Value   2020 141    BUN (mg/dL)   Date Value   2020 7    Glucose (mg/dL)   Date Value   2020 104      Potassium (mmol/L)   Date Value   2020 4.1    Creatinine (mg/dL)   Date Value   2020 0.8         Obesity  Weight has gone up 63 lbs since last visit 2021    Patient Active Problem List   Diagnosis    Primary hypertension         Review of Systems   Constitutional:  Negative for activity change, appetite change, fatigue, fever and unexpected weight change. HENT:  Negative for ear pain, rhinorrhea, sore throat and trouble swallowing. Eyes:  Negative for visual disturbance. Respiratory:  Negative for cough and shortness of breath. Cardiovascular:  Negative for chest pain, palpitations and leg swelling. Gastrointestinal:  Negative for abdominal pain, constipation, diarrhea, nausea and vomiting. Genitourinary:  Negative for flank pain. Musculoskeletal:  Negative for arthralgias, myalgias, neck pain and neck stiffness. Neurological:  Negative for headaches. Psychiatric/Behavioral:  Negative for decreased concentration and sleep disturbance. The patient is not nervous/anxious. Prior to Visit Medications    Medication Sig Taking? Authorizing Provider   FLUoxetine (PROZAC) 20 MG capsule Take 1 capsule by mouth daily Yes RODNEY Amaro   lisinopril (PRINIVIL;ZESTRIL) 20 MG tablet TAKE 1 TABLET DAILY Yes RODNYE Amaro        No Known Allergies    Past Medical History:   Diagnosis Date    Depression     Hypertension        History reviewed. No pertinent surgical history. Social History     Socioeconomic History    Marital status:      Spouse name: Not on file    Number of children: Not on file    Years of education: Not on file    Highest education level: Not on file   Occupational History    Not on file   Tobacco Use    Smoking status: Former    Smokeless tobacco: Never   Vaping Use    Vaping Use: Never used   Substance and Sexual Activity    Alcohol use: Yes     Alcohol/week: 2.0 standard drinks     Types: 2 Cans of beer per week     Comment: 1-2 beer weekly    Drug use: No    Sexual activity: Yes     Partners: Female   Other Topics Concern    Not on file   Social History Narrative    Not on file     Social Determinants of Health     Financial Resource Strain: Low Risk     Difficulty of Paying Living Expenses: Not hard at all   Food Insecurity: No Food Insecurity    Worried About [address] in the Last Year: Never true    [address] in the Last Year: Never true   Transportation Needs: Unknown    Lack of Transportation (Medical): Not on file    Lack of Transportation (Non-Medical):  No   Physical Activity: Not on file   Stress: Not on file   Social Connections: Not on file   Intimate Partner Violence: Not on file   Housing Stability: Unknown Unable to Pay for Housing in the Last Year: Not on file    Number of Places Lived in the Last Year: Not on file    Unstable Housing in the Last Year: No        Family History   Problem Relation Age of Onset    Other Mother         myasthenia gravis    Heart Disease Father     High Blood Pressure Father     High Cholesterol Father     Coronary Art Dis Father     Diabetes Father     Heart Attack Father     Hypertension Sister     Asthma Sister     Crohn's Disease Brother     Crohn's Disease Maternal Grandmother     Coronary Art Dis Maternal Grandmother     Inflam Bowel Dis Maternal Grandmother     Stroke Maternal Grandfather        ADVANCE DIRECTIVE: N, <no information>    Vitals:    02/13/23 1540   BP: 134/84   Pulse: 92   Temp: 97 °F (36.1 °C)   TempSrc: Temporal   SpO2: 98%   Weight: 261 lb (118.4 kg)   Height: 5' 9\" (1.753 m)     Estimated body mass index is 38.54 kg/m² as calculated from the following:    Height as of this encounter: 5' 9\" (1.753 m). Weight as of this encounter: 261 lb (118.4 kg). Physical Exam  Vitals reviewed. Constitutional:       Appearance: Normal appearance. He is well-developed. HENT:      Head: Normocephalic and atraumatic. Right Ear: Tympanic membrane, ear canal and external ear normal.      Left Ear: Tympanic membrane, ear canal and external ear normal.      Nose: Nose normal.      Comments: masked     Mouth/Throat:      Mouth: Mucous membranes are moist.      Pharynx: Oropharynx is clear. Comments: masked  Eyes:      General: Lids are normal.      Conjunctiva/sclera: Conjunctivae normal.      Pupils: Pupils are equal, round, and reactive to light. Neck:      Thyroid: No thyroid mass. Vascular: No carotid bruit. Trachea: Trachea normal.   Cardiovascular:      Rate and Rhythm: Normal rate and regular rhythm. Pulses: Normal pulses. Heart sounds: Normal heart sounds. No murmur heard.   Pulmonary:      Effort: Pulmonary effort is normal.      Breath sounds: Normal breath sounds. Abdominal:      General: Bowel sounds are normal. There is no distension. Palpations: Abdomen is soft. Tenderness: There is no abdominal tenderness. Genitourinary:     Comments: deferred  Musculoskeletal:         General: Normal range of motion. Cervical back: Full passive range of motion without pain and normal range of motion. Right lower leg: No edema. Left lower leg: No edema. Lymphadenopathy:      Cervical: No cervical adenopathy. Skin:     General: Skin is warm and dry. Capillary Refill: Capillary refill takes less than 2 seconds. Neurological:      Mental Status: He is alert and oriented to person, place, and time. GCS: GCS eye subscore is 4. GCS verbal subscore is 5. GCS motor subscore is 6. Cranial Nerves: No cranial nerve deficit. Sensory: No sensory deficit. Motor: Motor function is intact. Coordination: Coordination is intact. Coordination normal.      Gait: Gait normal.      Deep Tendon Reflexes: Reflexes are normal and symmetric. Psychiatric:         Attention and Perception: Attention normal.         Mood and Affect: Mood and affect normal.         Speech: Speech normal.         Behavior: Behavior normal. Behavior is cooperative. Thought Content: Thought content normal.         Cognition and Memory: Cognition normal.         Judgment: Judgment normal.       No flowsheet data found.     Lab Results   Component Value Date/Time    CHOL 151 11/09/2020 07:03 AM    CHOL 167 05/25/2018 07:56 AM    CHOL 177 06/03/2014 10:04 AM    TRIG 71 11/09/2020 07:03 AM    TRIG 90 05/25/2018 07:56 AM    TRIG 64 06/03/2014 10:04 AM    HDL 54 11/09/2020 07:03 AM    HDL 46 05/25/2018 07:56 AM    HDL 56 06/03/2014 10:04 AM    LDLCALC 83 11/09/2020 07:03 AM    LDLCALC 103 05/25/2018 07:56 AM    GLUCOSE 104 11/09/2020 07:03 AM       The 10-year ASCVD risk score (Tong DK, et al., 2019) is: 0.6%    Values used to calculate the score:      Age: 42 years      Sex: Male      Is Non- : No      Diabetic: No      Tobacco smoker: No      Systolic Blood Pressure: 134 mmHg      Is BP treated: No      HDL Cholesterol: 54 mg/dL      Total Cholesterol: 151 mg/dL    Immunization History   Administered Date(s) Administered    Influenza Virus Vaccine 10/17/2017, 10/26/2018, 11/10/2019    Influenza, FLUARIX, FLULAVAL, FLUZONE (age 6 mo+) AND AFLURIA, (age 3 y+), PF, 0.5mL 11/11/2020       Health Maintenance   Topic Date Due    COVID-19 Vaccine (1) Never done    Varicella vaccine (1 of 2 - 2-dose childhood series) Never done    Depression Monitoring  Never done    HIV screen  Never done    Hepatitis C screen  Never done    DTaP/Tdap/Td vaccine (1 - Tdap) Never done    Diabetes screen  Never done    Flu vaccine (1) 08/01/2022    Lipids  11/09/2025    Hepatitis A vaccine  Aged Out    Hib vaccine  Aged Out    Meningococcal (ACWY) vaccine  Aged Out    Pneumococcal 0-64 years Vaccine  Aged Out       Assessment & Plan   Routine physical examination  -     CBC with Auto Differential; Future  -     Comprehensive Metabolic Panel; Future  -     Lipid Panel; Future  -     TSH; Future  -     T4, Free; Future  -     Microalbumin / Creatinine Urine Ratio; Future  Primary hypertension  The current medical regimen is effective;  continue present plan and medications.    -     CBC with Auto Differential; Future  -     Comprehensive Metabolic Panel; Future  -     Lipid Panel; Future  -     TSH; Future  -     T4, Free; Future  -     Microalbumin / Creatinine Urine Ratio; Future  Positive depression screening  -     FLUoxetine (PROZAC) 20 MG capsule; Take 1 capsule by mouth daily, Disp-30 capsule, R-11Normal  Mild episode of recurrent major depressive disorder (HCC)  Starting on prozac.   Discussed SE of antidepressants: including weight gain, mood changes, possible sexual dysfunction. Discussed not stopping medication abruptly without consulting  office. Pt wishes to proceed. -     FLUoxetine (PROZAC) 20 MG capsule; Take 1 capsule by mouth daily, Disp-30 capsule, R-11Normal  Class 2 obesity due to excess calories without serious comorbidity with body mass index (BMI) of 38.0 to 38.9 in adult  Discussed weight loss  Return in about 6 weeks (around 3/27/2023) for mood follow up .          --Calista Reilly, APRN

## 2023-03-28 ENCOUNTER — TELEMEDICINE (OUTPATIENT)
Dept: FAMILY MEDICINE CLINIC | Age: 43
End: 2023-03-28
Payer: COMMERCIAL

## 2023-03-28 DIAGNOSIS — F33.0 MILD EPISODE OF RECURRENT MAJOR DEPRESSIVE DISORDER (HCC): Primary | ICD-10-CM

## 2023-03-28 PROCEDURE — 99213 OFFICE O/P EST LOW 20 MIN: CPT | Performed by: NURSE PRACTITIONER

## 2023-03-28 ASSESSMENT — PATIENT HEALTH QUESTIONNAIRE - PHQ9
2. FEELING DOWN, DEPRESSED OR HOPELESS: 1
5. POOR APPETITE OR OVEREATING: 0
SUM OF ALL RESPONSES TO PHQ QUESTIONS 1-9: 4
1. LITTLE INTEREST OR PLEASURE IN DOING THINGS: 1
8. MOVING OR SPEAKING SO SLOWLY THAT OTHER PEOPLE COULD HAVE NOTICED. OR THE OPPOSITE, BEING SO FIGETY OR RESTLESS THAT YOU HAVE BEEN MOVING AROUND A LOT MORE THAN USUAL: 0
SUM OF ALL RESPONSES TO PHQ QUESTIONS 1-9: 4
9. THOUGHTS THAT YOU WOULD BE BETTER OFF DEAD, OR OF HURTING YOURSELF: 0
4. FEELING TIRED OR HAVING LITTLE ENERGY: 1
10. IF YOU CHECKED OFF ANY PROBLEMS, HOW DIFFICULT HAVE THESE PROBLEMS MADE IT FOR YOU TO DO YOUR WORK, TAKE CARE OF THINGS AT HOME, OR GET ALONG WITH OTHER PEOPLE: 0
SUM OF ALL RESPONSES TO PHQ QUESTIONS 1-9: 4
3. TROUBLE FALLING OR STAYING ASLEEP: 0
6. FEELING BAD ABOUT YOURSELF - OR THAT YOU ARE A FAILURE OR HAVE LET YOURSELF OR YOUR FAMILY DOWN: 1
SUM OF ALL RESPONSES TO PHQ9 QUESTIONS 1 & 2: 2
7. TROUBLE CONCENTRATING ON THINGS, SUCH AS READING THE NEWSPAPER OR WATCHING TELEVISION: 0
SUM OF ALL RESPONSES TO PHQ QUESTIONS 1-9: 4

## 2023-03-28 ASSESSMENT — ENCOUNTER SYMPTOMS
COUGH: 0
RHINORRHEA: 0
TROUBLE SWALLOWING: 0
ABDOMINAL PAIN: 0
VOMITING: 0
SHORTNESS OF BREATH: 0
CONSTIPATION: 0
DIARRHEA: 0
NAUSEA: 0
SORE THROAT: 0

## 2023-03-28 NOTE — PROGRESS NOTES
Eyes:   EOM    [x]  Normal    [] Abnormal -   Sclera  [x]  Normal    [] Abnormal -          Discharge [x]  None visible   [] Abnormal -     HENT: [x] Normocephalic, atraumatic  [] Abnormal -   [x] Mouth/Throat: Mucous membranes are moist    External Ears [x] Normal  [] Abnormal -    Neck: [x] No visualized mass [] Abnormal -     Pulmonary/Chest: [x] Respiratory effort normal   [x] No visualized signs of difficulty breathing or respiratory distress        [] Abnormal -      Musculoskeletal:   [x] Normal gait with no signs of ataxia         [x] Normal range of motion of neck        [] Abnormal -     Neurological:        [x] No Facial Asymmetry (Cranial nerve 7 motor function) (limited exam due to video visit)          [x] No gaze palsy        [] Abnormal -          Skin:        [x] No significant exanthematous lesions or discoloration noted on facial skin         [] Abnormal -            Psychiatric:       [x] Normal Affect [] Abnormal -        [x] No Hallucinations    Other pertinent observable physical exam findings:-             Aydee Armendariz, was evaluated through a synchronous (real-time) audio-video encounter. The patient (or guardian if applicable) is aware that this is a billable service, which includes applicable co-pays. This Virtual Visit was conducted with patient's (and/or legal guardian's) consent. The visit was conducted pursuant to the emergency declaration under the [facility], [address] [place] [address] authority and the [employer] General Act. Patient identification was verified, and a caregiver was present when appropriate.    The patient was located at Home: [place]  Provider was located at [facility] (Appt Dept): [address] [facility],  [address]         --RODNEY Ibrahim

## 2023-04-20 ENCOUNTER — TELEPHONE (OUTPATIENT)
Dept: FAMILY MEDICINE CLINIC | Age: 43
End: 2023-04-20

## 2023-04-20 DIAGNOSIS — I10 PRIMARY HYPERTENSION: ICD-10-CM

## 2023-04-20 DIAGNOSIS — Z00.00 ROUTINE PHYSICAL EXAMINATION: ICD-10-CM

## 2023-04-20 LAB
ALBUMIN SERPL-MCNC: 4.5 G/DL (ref 3.5–5.2)
ALP SERPL-CCNC: 73 U/L (ref 40–130)
ALT SERPL-CCNC: 36 U/L (ref 5–41)
ANION GAP SERPL CALCULATED.3IONS-SCNC: 15 MMOL/L (ref 7–19)
AST SERPL-CCNC: 28 U/L (ref 5–40)
BASOPHILS # BLD: 0.1 K/UL (ref 0–0.2)
BASOPHILS NFR BLD: 1.6 % (ref 0–1)
BILIRUB SERPL-MCNC: 0.4 MG/DL (ref 0.2–1.2)
BUN SERPL-MCNC: 15 MG/DL (ref 6–20)
CALCIUM SERPL-MCNC: 9.3 MG/DL (ref 8.6–10)
CHLORIDE SERPL-SCNC: 104 MMOL/L (ref 98–111)
CHOLEST SERPL-MCNC: 168 MG/DL (ref 160–199)
CO2 SERPL-SCNC: 20 MMOL/L (ref 22–29)
CREAT SERPL-MCNC: 0.8 MG/DL (ref 0.5–1.2)
CREAT UR-MCNC: 177.1 MG/DL (ref 4.2–622)
EOSINOPHIL # BLD: 0.5 K/UL (ref 0–0.6)
EOSINOPHIL NFR BLD: 10.5 % (ref 0–5)
ERYTHROCYTE [DISTWIDTH] IN BLOOD BY AUTOMATED COUNT: 13.2 % (ref 11.5–14.5)
GLUCOSE SERPL-MCNC: 80 MG/DL (ref 74–109)
HCT VFR BLD AUTO: 42.1 % (ref 42–52)
HDLC SERPL-MCNC: 47 MG/DL (ref 55–121)
HGB BLD-MCNC: 13.8 G/DL (ref 14–18)
IMM GRANULOCYTES # BLD: 0 K/UL
LDLC SERPL CALC-MCNC: 109 MG/DL
LYMPHOCYTES # BLD: 1.7 K/UL (ref 1.1–4.5)
LYMPHOCYTES NFR BLD: 37.7 % (ref 20–40)
MCH RBC QN AUTO: 30.9 PG (ref 27–31)
MCHC RBC AUTO-ENTMCNC: 32.8 G/DL (ref 33–37)
MCV RBC AUTO: 94.4 FL (ref 80–94)
MICROALBUMIN UR-MCNC: <1.2 MG/DL (ref 0–19)
MICROALBUMIN/CREAT UR-RTO: NORMAL MG/G
MONOCYTES # BLD: 0.5 K/UL (ref 0–0.9)
MONOCYTES NFR BLD: 11.8 % (ref 0–10)
NEUTROPHILS # BLD: 1.7 K/UL (ref 1.5–7.5)
NEUTS SEG NFR BLD: 37.9 % (ref 50–65)
PLATELET # BLD AUTO: 303 K/UL (ref 130–400)
PMV BLD AUTO: 10.7 FL (ref 9.4–12.4)
POTASSIUM SERPL-SCNC: 3.9 MMOL/L (ref 3.5–5)
PROT SERPL-MCNC: 7.3 G/DL (ref 6.6–8.7)
RBC # BLD AUTO: 4.46 M/UL (ref 4.7–6.1)
SODIUM SERPL-SCNC: 139 MMOL/L (ref 136–145)
T4 FREE SERPL-MCNC: 1.29 NG/DL (ref 0.93–1.7)
TRIGL SERPL-MCNC: 58 MG/DL (ref 0–149)
TSH SERPL DL<=0.005 MIU/L-ACNC: 0.74 UIU/ML (ref 0.27–4.2)
WBC # BLD AUTO: 4.4 K/UL (ref 4.8–10.8)

## 2023-04-20 NOTE — TELEPHONE ENCOUNTER
----- Message from RODNEY Aguilar sent at 4/20/2023  1:15 PM CDT -----  Please call patient and let them know results. Your LDL cholesterol is mildly elevated, but within the range of getting it down by diet and exercise. Your metabolic profile is normal.  This includes kidney and liver functions as well as electrolytes.   Blood counts are normal except for elevated eosinophil count which is usually elevated with allergies  Normal thyroid

## 2023-05-15 DIAGNOSIS — Z13.31 POSITIVE DEPRESSION SCREENING: ICD-10-CM

## 2023-05-15 DIAGNOSIS — F33.0 MILD EPISODE OF RECURRENT MAJOR DEPRESSIVE DISORDER (HCC): ICD-10-CM

## 2023-05-15 RX ORDER — FLUOXETINE HYDROCHLORIDE 20 MG/1
20 CAPSULE ORAL DAILY
Qty: 30 CAPSULE | Refills: 11 | Status: SHIPPED | OUTPATIENT
Start: 2023-05-15

## 2023-05-15 NOTE — TELEPHONE ENCOUNTER
Received fax from pharmacy requesting refill on pts medication(s). Pt was last seen in office on 3/28/2023  and has a follow up scheduled for Visit date not found. Will send request to  Macrina Jalloh  for patient.      Requested Prescriptions     Pending Prescriptions Disp Refills    FLUoxetine (PROZAC) 20 MG capsule 30 capsule 11     Sig: Take 1 capsule by mouth daily     Change of pharmacy

## 2023-05-18 ENCOUNTER — OFFICE VISIT (OUTPATIENT)
Dept: FAMILY MEDICINE CLINIC | Age: 43
End: 2023-05-18
Payer: COMMERCIAL

## 2023-05-18 VITALS
DIASTOLIC BLOOD PRESSURE: 80 MMHG | BODY MASS INDEX: 39.25 KG/M2 | OXYGEN SATURATION: 98 % | HEIGHT: 69 IN | SYSTOLIC BLOOD PRESSURE: 122 MMHG | TEMPERATURE: 98.4 F | HEART RATE: 89 BPM | WEIGHT: 265 LBS

## 2023-05-18 DIAGNOSIS — H69.81 DYSFUNCTION OF RIGHT EUSTACHIAN TUBE: ICD-10-CM

## 2023-05-18 DIAGNOSIS — H65.111 ACUTE MUCOID OTITIS MEDIA OF RIGHT EAR: Primary | ICD-10-CM

## 2023-05-18 PROCEDURE — 3074F SYST BP LT 130 MM HG: CPT | Performed by: NURSE PRACTITIONER

## 2023-05-18 PROCEDURE — 3079F DIAST BP 80-89 MM HG: CPT | Performed by: NURSE PRACTITIONER

## 2023-05-18 PROCEDURE — 99213 OFFICE O/P EST LOW 20 MIN: CPT | Performed by: NURSE PRACTITIONER

## 2023-05-18 RX ORDER — PSEUDOEPHEDRINE HCL 30 MG
30 TABLET ORAL EVERY 6 HOURS PRN
Qty: 14 TABLET | Refills: 1 | Status: SHIPPED | OUTPATIENT
Start: 2023-05-18 | End: 2024-05-17

## 2023-05-18 RX ORDER — CEFDINIR 300 MG/1
300 CAPSULE ORAL 2 TIMES DAILY
Qty: 20 CAPSULE | Refills: 0 | Status: SHIPPED | OUTPATIENT
Start: 2023-05-18 | End: 2023-05-28

## 2023-05-18 RX ORDER — FLUTICASONE PROPIONATE 50 MCG
2 SPRAY, SUSPENSION (ML) NASAL DAILY
Qty: 16 G | Refills: 0 | Status: SHIPPED | OUTPATIENT
Start: 2023-05-18

## 2023-05-18 ASSESSMENT — ENCOUNTER SYMPTOMS
WHEEZING: 0
CONSTIPATION: 0
COUGH: 0
CHOKING: 0
DIARRHEA: 0
BLOOD IN STOOL: 0
EYE DISCHARGE: 0
RHINORRHEA: 0
SORE THROAT: 0
EYE REDNESS: 0

## 2023-05-18 NOTE — PROGRESS NOTES
Fidencio Arciniega (:  1980) is a 38 y.o. male,{New vs Established:[id number]::\"Established patient\"}, here for evaluation of the following chief complaint(s):  Otalgia (Patient states he feels like he has fluid in his ear. When he steps real hard he says he can hear it. Patient denies swimming, patient denies fever. )      ASSESSMENT/PLAN:  No diagnosis found. No follow-ups on file. SUBJECTIVE/OBJECTIVE:  HPI    Review of Systems    /80 (Site: Left Upper Arm, Position: Sitting, Cuff Size: Large Adult)   Pulse 89   Temp 98.4 °F (36.9 °C) (Temporal)   Ht 5' 9\" (1.753 m)   Wt 265 lb (120.2 kg)   SpO2 98%   BMI 39.13 kg/m²    Physical Exam      {Time Documentation Optional:[id number]}      An electronic signature was used to authenticate this note.     --RODNEY Islas

## 2023-05-18 NOTE — PROGRESS NOTES
Nola Hinds (:  1980) is a 38 y.o. male,Established patient, here for evaluation of the following chief complaint(s):  Otalgia (Patient states he feels like he has fluid in his ear. When he steps real hard he says he can hear it. Patient denies swimming, patient denies fever. )      ASSESSMENT/PLAN:    ICD-10-CM    1. Acute mucoid otitis media of right ear  H65.111 cefdinir (OMNICEF) 300 MG capsule      2. Dysfunction of right eustachian tube  H69.81 fluticasone (FLONASE) 50 MCG/ACT nasal spray     pseudoephedrine (DECONGESTANT) 30 MG tablet          Return if symptoms worsen or fail to improve. SUBJECTIVE/OBJECTIVE:  Otalgia   Pertinent negatives include no coughing, diarrhea, rash, rhinorrhea or sore throat. Otalgia (Patient states he feels like he has fluid in his ear. When he steps real hard he says he can hear it. Patient denies swimming, patient denies fever. )  Review of Systems   Constitutional:  Negative for appetite change and unexpected weight change. HENT:  Positive for congestion and ear pain. Negative for rhinorrhea and sore throat. Eyes:  Negative for discharge and redness. Respiratory:  Negative for cough, choking and wheezing. Cardiovascular:  Negative for chest pain. Gastrointestinal:  Negative for blood in stool, constipation and diarrhea. Genitourinary:  Negative for decreased urine volume and dysuria. Skin:  Negative for rash. Neurological:  Negative for weakness. Hematological:  Negative for adenopathy. Psychiatric/Behavioral:  Negative for suicidal ideas. /80 (Site: Left Upper Arm, Position: Sitting, Cuff Size: Large Adult)   Pulse 89   Temp 98.4 °F (36.9 °C) (Temporal)   Ht 5' 9\" (1.753 m)   Wt 265 lb (120.2 kg)   SpO2 98%   BMI 39.13 kg/m²    Physical Exam  Vitals reviewed. Constitutional:       Appearance: He is well-developed. HENT:      Head: Normocephalic. Right Ear: A middle ear effusion is present.    Eyes:

## 2023-07-31 ENCOUNTER — OFFICE VISIT (OUTPATIENT)
Dept: FAMILY MEDICINE CLINIC | Age: 43
End: 2023-07-31
Payer: COMMERCIAL

## 2023-07-31 VITALS
WEIGHT: 272 LBS | HEART RATE: 69 BPM | HEIGHT: 69 IN | SYSTOLIC BLOOD PRESSURE: 124 MMHG | DIASTOLIC BLOOD PRESSURE: 73 MMHG | TEMPERATURE: 98.7 F | BODY MASS INDEX: 40.29 KG/M2 | OXYGEN SATURATION: 98 %

## 2023-07-31 DIAGNOSIS — E66.01 CLASS 3 SEVERE OBESITY DUE TO EXCESS CALORIES WITHOUT SERIOUS COMORBIDITY WITH BODY MASS INDEX (BMI) OF 40.0 TO 44.9 IN ADULT (HCC): Primary | ICD-10-CM

## 2023-07-31 PROCEDURE — 3074F SYST BP LT 130 MM HG: CPT | Performed by: NURSE PRACTITIONER

## 2023-07-31 PROCEDURE — 99213 OFFICE O/P EST LOW 20 MIN: CPT | Performed by: NURSE PRACTITIONER

## 2023-07-31 PROCEDURE — 3078F DIAST BP <80 MM HG: CPT | Performed by: NURSE PRACTITIONER

## 2023-07-31 RX ORDER — LIRAGLUTIDE 6 MG/ML
INJECTION, SOLUTION SUBCUTANEOUS
Qty: 5 ADJUSTABLE DOSE PRE-FILLED PEN SYRINGE | Refills: 5 | Status: SHIPPED | OUTPATIENT
Start: 2023-07-31 | End: 2023-09-25

## 2023-07-31 ASSESSMENT — ENCOUNTER SYMPTOMS
ABDOMINAL PAIN: 0
SHORTNESS OF BREATH: 0
CONSTIPATION: 0
VOMITING: 0
NAUSEA: 0
SORE THROAT: 0
COUGH: 0
TROUBLE SWALLOWING: 0
DIARRHEA: 0
RHINORRHEA: 0

## 2023-07-31 NOTE — PATIENT INSTRUCTIONS
Eat breakfast within 30 minutes of waking up. Do not eat after 7 PM  Only drink water   Try to do 30 minutes of continuous exercise a day. This could be anything from walking to running.

## 2023-11-14 ENCOUNTER — OFFICE VISIT (OUTPATIENT)
Dept: FAMILY MEDICINE CLINIC | Age: 43
End: 2023-11-14
Payer: COMMERCIAL

## 2023-11-14 VITALS
HEART RATE: 100 BPM | HEIGHT: 69 IN | BODY MASS INDEX: 42.51 KG/M2 | OXYGEN SATURATION: 98 % | DIASTOLIC BLOOD PRESSURE: 90 MMHG | SYSTOLIC BLOOD PRESSURE: 152 MMHG | TEMPERATURE: 99.3 F | WEIGHT: 287 LBS

## 2023-11-14 DIAGNOSIS — F33.0 MILD EPISODE OF RECURRENT MAJOR DEPRESSIVE DISORDER (HCC): ICD-10-CM

## 2023-11-14 DIAGNOSIS — F41.9 ANXIETY AND DEPRESSION: Primary | ICD-10-CM

## 2023-11-14 DIAGNOSIS — F32.A ANXIETY AND DEPRESSION: Primary | ICD-10-CM

## 2023-11-14 DIAGNOSIS — Z13.31 POSITIVE DEPRESSION SCREENING: ICD-10-CM

## 2023-11-14 PROCEDURE — 3080F DIAST BP >= 90 MM HG: CPT | Performed by: PEDIATRICS

## 2023-11-14 PROCEDURE — 99213 OFFICE O/P EST LOW 20 MIN: CPT | Performed by: PEDIATRICS

## 2023-11-14 PROCEDURE — 3077F SYST BP >= 140 MM HG: CPT | Performed by: PEDIATRICS

## 2023-11-14 RX ORDER — FLUOXETINE HYDROCHLORIDE 40 MG/1
40 CAPSULE ORAL DAILY
Qty: 90 CAPSULE | Refills: 3 | Status: SHIPPED | OUTPATIENT
Start: 2023-11-14

## 2023-11-14 ASSESSMENT — ENCOUNTER SYMPTOMS
DIARRHEA: 0
SORE THROAT: 0
BLOOD IN STOOL: 0
WHEEZING: 0
EYE REDNESS: 0
CHOKING: 0
CONSTIPATION: 0
COUGH: 0
RHINORRHEA: 0
EYE DISCHARGE: 0

## 2023-11-14 NOTE — PROGRESS NOTES
History   Problem Relation Age of Onset    Other Mother         myasthenia gravis    Heart Disease Father     High Blood Pressure Father     High Cholesterol Father     Coronary Art Dis Father     Diabetes Father     Heart Attack Father     Hypertension Sister     Asthma Sister     Crohn's Disease Brother     Crohn's Disease Maternal Grandmother     Coronary Art Dis Maternal Grandmother     Inflam Bowel Dis Maternal Grandmother     Stroke Maternal Grandfather        No past surgical history on file. Social History     Tobacco Use    Smoking status: Former    Smokeless tobacco: Never   Substance Use Topics    Alcohol use: Yes     Alcohol/week: 4.0 standard drinks of alcohol     Types: 2 Cans of beer, 2 Shots of liquor per week     Comment: 1-2 beer weekly        Review of Systems   Constitutional:  Negative for appetite change and unexpected weight change. HENT:  Positive for congestion and ear pain. Negative for rhinorrhea and sore throat. Eyes:  Negative for discharge and redness. Respiratory:  Negative for cough, choking and wheezing. Cardiovascular:  Negative for chest pain. Gastrointestinal:  Negative for blood in stool, constipation and diarrhea. Genitourinary:  Negative for decreased urine volume and dysuria. Skin:  Negative for rash. Neurological:  Negative for weakness. Hematological:  Negative for adenopathy. Psychiatric/Behavioral:  Negative for suicidal ideas. Physical Exam  Constitutional:       General: He is not in acute distress. Appearance: He is well-developed. He is obese. HENT:      Head: Normocephalic and atraumatic. Right Ear: Tympanic membrane, ear canal and external ear normal.      Left Ear: Tympanic membrane, ear canal and external ear normal.      Nose: Nose normal.      Mouth/Throat:      Mouth: Mucous membranes are moist.      Pharynx: Oropharynx is clear. Eyes:      General: No scleral icterus.      Extraocular Movements: Extraocular movements

## 2024-01-28 DIAGNOSIS — Z13.31 POSITIVE DEPRESSION SCREENING: ICD-10-CM

## 2024-01-28 DIAGNOSIS — F33.0 MILD EPISODE OF RECURRENT MAJOR DEPRESSIVE DISORDER (HCC): ICD-10-CM

## 2024-01-28 DIAGNOSIS — I10 ESSENTIAL HYPERTENSION: ICD-10-CM

## 2024-01-29 RX ORDER — FLUOXETINE HYDROCHLORIDE 40 MG/1
40 CAPSULE ORAL DAILY
Qty: 90 CAPSULE | Refills: 3 | Status: SHIPPED | OUTPATIENT
Start: 2024-01-29

## 2024-01-29 NOTE — TELEPHONE ENCOUNTER
Received fax from pharmacy requesting refill on pts medication(s). Pt was last seen in office on 11/14/2023  and has a follow up scheduled for 1/28/2024. Will send request to  Anoop Morales  for authorization.     Requested Prescriptions     Pending Prescriptions Disp Refills    FLUoxetine (PROZAC) 40 MG capsule 90 capsule 3     Sig: Take 1 capsule by mouth daily

## 2024-01-29 NOTE — TELEPHONE ENCOUNTER
Received fax from pharmacy requesting refill on pts medication(s). Pt was last seen in office on 11/14/2023  and has a follow up scheduled for Visit date not found. Will send request to  Anoop Morales  for authorization.     Requested Prescriptions     Pending Prescriptions Disp Refills    lisinopril (PRINIVIL;ZESTRIL) 20 MG tablet 90 tablet 3     Sig: Take 1 tablet by mouth daily

## 2024-01-30 RX ORDER — LISINOPRIL 20 MG/1
20 TABLET ORAL DAILY
Qty: 90 TABLET | Refills: 3 | Status: SHIPPED | OUTPATIENT
Start: 2024-01-30

## 2024-04-22 ENCOUNTER — NURSE ONLY (OUTPATIENT)
Dept: FAMILY MEDICINE CLINIC | Age: 44
End: 2024-04-22

## 2024-04-22 SDOH — ECONOMIC STABILITY: FOOD INSECURITY: WITHIN THE PAST 12 MONTHS, THE FOOD YOU BOUGHT JUST DIDN'T LAST AND YOU DIDN'T HAVE MONEY TO GET MORE.: NEVER TRUE

## 2024-04-22 SDOH — ECONOMIC STABILITY: INCOME INSECURITY: HOW HARD IS IT FOR YOU TO PAY FOR THE VERY BASICS LIKE FOOD, HOUSING, MEDICAL CARE, AND HEATING?: NOT HARD AT ALL

## 2024-04-22 SDOH — ECONOMIC STABILITY: FOOD INSECURITY: WITHIN THE PAST 12 MONTHS, YOU WORRIED THAT YOUR FOOD WOULD RUN OUT BEFORE YOU GOT MONEY TO BUY MORE.: NEVER TRUE

## 2024-04-22 NOTE — PROGRESS NOTES
Question 4/22/2024  9:40 AM CDT - Filed by Patient   How hard is it for you to pay for the very basics like food, housing, medical care, and heating? Not very hard   Within the past 12 months, you worried that your food would run out before you got the money to buy more. Never true   Within the past 12 months, the food you bought just didn’t last and you didn’t have money to get more. Never true   In the past 12 months, has lack of transportation kept you from meetings, work, or from getting things needed for daily living? No   In the last 12 months, was there a time when you did not have a steady place to sleep or slept in a shelter (including now)? No   Would you like resources for any of these topics? No, thank you

## 2024-04-29 ASSESSMENT — PATIENT HEALTH QUESTIONNAIRE - PHQ9
1. LITTLE INTEREST OR PLEASURE IN DOING THINGS: SEVERAL DAYS
7. TROUBLE CONCENTRATING ON THINGS, SUCH AS READING THE NEWSPAPER OR WATCHING TELEVISION: MORE THAN HALF THE DAYS
7. TROUBLE CONCENTRATING ON THINGS, SUCH AS READING THE NEWSPAPER OR WATCHING TELEVISION: MORE THAN HALF THE DAYS
2. FEELING DOWN, DEPRESSED OR HOPELESS: SEVERAL DAYS
5. POOR APPETITE OR OVEREATING: NEARLY EVERY DAY
8. MOVING OR SPEAKING SO SLOWLY THAT OTHER PEOPLE COULD HAVE NOTICED. OR THE OPPOSITE, BEING SO FIGETY OR RESTLESS THAT YOU HAVE BEEN MOVING AROUND A LOT MORE THAN USUAL: NOT AT ALL
5. POOR APPETITE OR OVEREATING: NEARLY EVERY DAY
9. THOUGHTS THAT YOU WOULD BE BETTER OFF DEAD, OR OF HURTING YOURSELF: NOT AT ALL
9. THOUGHTS THAT YOU WOULD BE BETTER OFF DEAD, OR OF HURTING YOURSELF: NOT AT ALL
8. MOVING OR SPEAKING SO SLOWLY THAT OTHER PEOPLE COULD HAVE NOTICED. OR THE OPPOSITE - BEING SO FIDGETY OR RESTLESS THAT YOU HAVE BEEN MOVING AROUND A LOT MORE THAN USUAL: NOT AT ALL
6. FEELING BAD ABOUT YOURSELF - OR THAT YOU ARE A FAILURE OR HAVE LET YOURSELF OR YOUR FAMILY DOWN: MORE THAN HALF THE DAYS
10. IF YOU CHECKED OFF ANY PROBLEMS, HOW DIFFICULT HAVE THESE PROBLEMS MADE IT FOR YOU TO DO YOUR WORK, TAKE CARE OF THINGS AT HOME, OR GET ALONG WITH OTHER PEOPLE: SOMEWHAT DIFFICULT
2. FEELING DOWN, DEPRESSED OR HOPELESS: SEVERAL DAYS
SUM OF ALL RESPONSES TO PHQ QUESTIONS 1-9: 13
SUM OF ALL RESPONSES TO PHQ QUESTIONS 1-9: 13
1. LITTLE INTEREST OR PLEASURE IN DOING THINGS: SEVERAL DAYS
3. TROUBLE FALLING OR STAYING ASLEEP: SEVERAL DAYS
10. IF YOU CHECKED OFF ANY PROBLEMS, HOW DIFFICULT HAVE THESE PROBLEMS MADE IT FOR YOU TO DO YOUR WORK, TAKE CARE OF THINGS AT HOME, OR GET ALONG WITH OTHER PEOPLE: SOMEWHAT DIFFICULT
SUM OF ALL RESPONSES TO PHQ QUESTIONS 1-9: 13
6. FEELING BAD ABOUT YOURSELF - OR THAT YOU ARE A FAILURE OR HAVE LET YOURSELF OR YOUR FAMILY DOWN: MORE THAN HALF THE DAYS
SUM OF ALL RESPONSES TO PHQ QUESTIONS 1-9: 13
4. FEELING TIRED OR HAVING LITTLE ENERGY: NEARLY EVERY DAY
4. FEELING TIRED OR HAVING LITTLE ENERGY: NEARLY EVERY DAY
SUM OF ALL RESPONSES TO PHQ9 QUESTIONS 1 & 2: 2
SUM OF ALL RESPONSES TO PHQ QUESTIONS 1-9: 13
3. TROUBLE FALLING OR STAYING ASLEEP: SEVERAL DAYS

## 2024-05-02 ENCOUNTER — OFFICE VISIT (OUTPATIENT)
Dept: FAMILY MEDICINE CLINIC | Age: 44
End: 2024-05-02
Payer: COMMERCIAL

## 2024-05-02 VITALS
OXYGEN SATURATION: 98 % | WEIGHT: 290 LBS | SYSTOLIC BLOOD PRESSURE: 134 MMHG | HEART RATE: 72 BPM | DIASTOLIC BLOOD PRESSURE: 82 MMHG | BODY MASS INDEX: 42.83 KG/M2 | TEMPERATURE: 98.4 F

## 2024-05-02 DIAGNOSIS — E66.09 CLASS 2 OBESITY DUE TO EXCESS CALORIES WITHOUT SERIOUS COMORBIDITY WITH BODY MASS INDEX (BMI) OF 38.0 TO 38.9 IN ADULT: ICD-10-CM

## 2024-05-02 DIAGNOSIS — F33.0 MILD EPISODE OF RECURRENT MAJOR DEPRESSIVE DISORDER (HCC): ICD-10-CM

## 2024-05-02 DIAGNOSIS — Z00.00 ENCOUNTER FOR WELL ADULT EXAM WITHOUT ABNORMAL FINDINGS: ICD-10-CM

## 2024-05-02 DIAGNOSIS — I10 ESSENTIAL HYPERTENSION: ICD-10-CM

## 2024-05-02 DIAGNOSIS — G47.19 DAYTIME HYPERSOMNOLENCE: ICD-10-CM

## 2024-05-02 DIAGNOSIS — Z00.00 ROUTINE PHYSICAL EXAMINATION: ICD-10-CM

## 2024-05-02 DIAGNOSIS — Z00.00 ROUTINE PHYSICAL EXAMINATION: Primary | ICD-10-CM

## 2024-05-02 DIAGNOSIS — L29.9 ITCHING OF EAR: ICD-10-CM

## 2024-05-02 LAB
ALBUMIN SERPL-MCNC: 4.2 G/DL (ref 3.5–5.2)
ALP SERPL-CCNC: 97 U/L (ref 40–130)
ALT SERPL-CCNC: 42 U/L (ref 5–41)
ANION GAP SERPL CALCULATED.3IONS-SCNC: 15 MMOL/L (ref 7–19)
AST SERPL-CCNC: 25 U/L (ref 5–40)
BASOPHILS # BLD: 0.1 K/UL (ref 0–0.2)
BASOPHILS NFR BLD: 1.4 % (ref 0–1)
BILIRUB SERPL-MCNC: 0.3 MG/DL (ref 0.2–1.2)
BUN SERPL-MCNC: 9 MG/DL (ref 6–20)
CALCIUM SERPL-MCNC: 9 MG/DL (ref 8.6–10)
CHLORIDE SERPL-SCNC: 103 MMOL/L (ref 98–111)
CHOLEST SERPL-MCNC: 197 MG/DL (ref 160–199)
CO2 SERPL-SCNC: 22 MMOL/L (ref 22–29)
CREAT SERPL-MCNC: 0.9 MG/DL (ref 0.5–1.2)
CREAT UR-MCNC: 63.9 MG/DL (ref 39–259)
EOSINOPHIL # BLD: 0.6 K/UL (ref 0–0.6)
EOSINOPHIL NFR BLD: 9.2 % (ref 0–5)
ERYTHROCYTE [DISTWIDTH] IN BLOOD BY AUTOMATED COUNT: 13.2 % (ref 11.5–14.5)
GLUCOSE SERPL-MCNC: 104 MG/DL (ref 74–109)
HCT VFR BLD AUTO: 42.3 % (ref 42–52)
HDLC SERPL-MCNC: 46 MG/DL (ref 55–121)
HGB BLD-MCNC: 13.9 G/DL (ref 14–18)
IMM GRANULOCYTES # BLD: 0 K/UL
LDLC SERPL CALC-MCNC: 123 MG/DL
LYMPHOCYTES # BLD: 2 K/UL (ref 1.1–4.5)
LYMPHOCYTES NFR BLD: 30.8 % (ref 20–40)
MCH RBC QN AUTO: 30.9 PG (ref 27–31)
MCHC RBC AUTO-ENTMCNC: 32.9 G/DL (ref 33–37)
MCV RBC AUTO: 94 FL (ref 80–94)
MICROALBUMIN UR-MCNC: <1.2 MG/DL (ref 0–19)
MICROALBUMIN/CREAT UR-RTO: NORMAL MG/G
MONOCYTES # BLD: 0.5 K/UL (ref 0–0.9)
MONOCYTES NFR BLD: 7.9 % (ref 0–10)
NEUTROPHILS # BLD: 3.3 K/UL (ref 1.5–7.5)
NEUTS SEG NFR BLD: 50.5 % (ref 50–65)
PLATELET # BLD AUTO: 291 K/UL (ref 130–400)
PMV BLD AUTO: 10.7 FL (ref 9.4–12.4)
POTASSIUM SERPL-SCNC: 3.8 MMOL/L (ref 3.5–5)
PROT SERPL-MCNC: 7.3 G/DL (ref 6.6–8.7)
RBC # BLD AUTO: 4.5 M/UL (ref 4.7–6.1)
SODIUM SERPL-SCNC: 140 MMOL/L (ref 136–145)
T4 FREE SERPL-MCNC: 0.99 NG/DL (ref 0.93–1.7)
TRIGL SERPL-MCNC: 138 MG/DL (ref 0–149)
TSH SERPL DL<=0.005 MIU/L-ACNC: 1.15 UIU/ML (ref 0.27–4.2)
WBC # BLD AUTO: 6.6 K/UL (ref 4.8–10.8)

## 2024-05-02 PROCEDURE — 3075F SYST BP GE 130 - 139MM HG: CPT | Performed by: NURSE PRACTITIONER

## 2024-05-02 PROCEDURE — 3079F DIAST BP 80-89 MM HG: CPT | Performed by: NURSE PRACTITIONER

## 2024-05-02 PROCEDURE — 99396 PREV VISIT EST AGE 40-64: CPT | Performed by: NURSE PRACTITIONER

## 2024-05-02 RX ORDER — ELECTROLYTES/DEXTROSE
SOLUTION, ORAL ORAL DAILY
COMMUNITY

## 2024-05-02 RX ORDER — MOMETASONE FUROATE 1 MG/G
CREAM TOPICAL
Qty: 45 G | Refills: 0 | Status: SHIPPED | OUTPATIENT
Start: 2024-05-02

## 2024-05-02 SDOH — ECONOMIC STABILITY: FOOD INSECURITY: WITHIN THE PAST 12 MONTHS, THE FOOD YOU BOUGHT JUST DIDN'T LAST AND YOU DIDN'T HAVE MONEY TO GET MORE.: NEVER TRUE

## 2024-05-02 SDOH — ECONOMIC STABILITY: FOOD INSECURITY: WITHIN THE PAST 12 MONTHS, YOU WORRIED THAT YOUR FOOD WOULD RUN OUT BEFORE YOU GOT MONEY TO BUY MORE.: NEVER TRUE

## 2024-05-02 SDOH — ECONOMIC STABILITY: INCOME INSECURITY: HOW HARD IS IT FOR YOU TO PAY FOR THE VERY BASICS LIKE FOOD, HOUSING, MEDICAL CARE, AND HEATING?: NOT HARD AT ALL

## 2024-05-02 ASSESSMENT — ENCOUNTER SYMPTOMS
NAUSEA: 0
RHINORRHEA: 0
ABDOMINAL PAIN: 0
SHORTNESS OF BREATH: 0
SORE THROAT: 0
DIARRHEA: 0
TROUBLE SWALLOWING: 0
VOMITING: 0
COUGH: 0
CONSTIPATION: 0

## 2024-05-02 NOTE — PROGRESS NOTES
2024    Jericho Alexander (:  1980) is a 43 y.o. male, here for a preventive medicine evaluation.    Subjective      Eyes: July last year   Dentist:  routinely  Skin:  no concerns  Labs:  due  PSA: no family hx of prostate cancer  Nocturia:  one  Stream: no concerns  ED:  no concerns  Colonoscopy:  no family hx of colon cancer  Exercise: walk 3 miles a day  Diet and Nut:   eat bad  MVI:  none  Supplements:  none  Asa: none  Depression:  positive screening   Body Image: weight gain  Fall:  none  EOL:  none  Tobacco: none   Substance: occasional alcohol, no illicits  Immunization:  yearly flu, covid booster  Sleep: good  Cognition: good     Health Maintenance: needs yearly flu, covid booster, fasting labs     Hypertension:    Medications:              Lisinopril   Home blood pressure monitoring: No.  He is not adherent to a low sodium diet. Patient denies chest pain, shortness of breath, and headache.  Antihypertensive medication side effects: no medication side effects noted.  Use of agents associated with hypertension: none.                            Obesity  Weight has 3 lbs since last visit 2023  Tried Keto but gave me diarrhea.     Mood Disorder:    Medications:   Prozac- .   depression. Current complaints include: much better going to counseling every few weeks.  He denies any other symptoms. Symptoms/signs of arnoldo: none.  External stressors: yesterday 1 year anniversary of father's death. Medication side effects: none. Denies SI.   \"I dont need anything at this junction\"  PHQ-9 Total Score: 13 (2024  1:29 PM)  Thoughts that you would be better off dead, or of hurting yourself in some way: 0 (2024  1:29 PM)     Daytime drowsiness  Wife sleeps with headphones, she has recorded me snoring  Tired during the day.     Left Ear itches  Have tried hydrocortisone cream with little relief.   Seems to be worse with allergies.       Patient Active Problem List   Diagnosis    Primary

## 2024-05-02 NOTE — PATIENT INSTRUCTIONS
foods. These include fruits, vegetables, whole grains, low-fat dairy, and lean protein.    Choose healthy portions of food. You can use the Nutrition Facts label on food packages as a guide.    Eat more fruits and vegetables. You could add vegetables to sandwiches or add fruit to cereal.    Drink water when you are thirsty. Limit soda, juice, and sports drinks.    Try to exercise most days. Aim for at least 2½ hours of exercise each week.    Keep moving. Work in the garden or take your dog on a walk. Use the stairs instead of the elevator.    If you use tobacco or nicotine, try to quit. Ask your doctor about programs and medicines to help you quit.    Limit alcohol. Men should have no more than 2 drinks a day. Women should have no more than 1. For some people, no alcohol is the best choice.  Follow-up care is a key part of your treatment and safety. Be sure to make and go to all appointments, and call your doctor if you are having problems. It's also a good idea to know your test results and keep a list of the medicines you take.  Where can you learn more?  Go to https://www.[employer].com.net/patientEd and enter U807 to learn more about \"A Healthy Lifestyle: Care Instructions.\"  Current as of: August 6, 2023               Content Version: 14.0  © 2006-2024 [employer].   Care instructions adapted under license by [employer]. If you have questions about a medical condition or this instruction, always ask your healthcare professional. [employer] disclaims any warranty or liability for your use of this information.

## 2024-11-14 ENCOUNTER — OFFICE VISIT (OUTPATIENT)
Dept: FAMILY MEDICINE CLINIC | Age: 44
End: 2024-11-14
Payer: COMMERCIAL

## 2024-11-14 VITALS
BODY MASS INDEX: 44.14 KG/M2 | WEIGHT: 298 LBS | HEIGHT: 69 IN | HEART RATE: 76 BPM | TEMPERATURE: 97.7 F | OXYGEN SATURATION: 97 % | SYSTOLIC BLOOD PRESSURE: 146 MMHG | DIASTOLIC BLOOD PRESSURE: 84 MMHG

## 2024-11-14 DIAGNOSIS — F33.42 RECURRENT MAJOR DEPRESSIVE DISORDER, IN FULL REMISSION (HCC): ICD-10-CM

## 2024-11-14 DIAGNOSIS — H69.91 DYSFUNCTION OF RIGHT EUSTACHIAN TUBE: Primary | ICD-10-CM

## 2024-11-14 DIAGNOSIS — I10 PRIMARY HYPERTENSION: ICD-10-CM

## 2024-11-14 DIAGNOSIS — E66.813 CLASS 3 SEVERE OBESITY DUE TO EXCESS CALORIES WITHOUT SERIOUS COMORBIDITY WITH BODY MASS INDEX (BMI) OF 40.0 TO 44.9 IN ADULT: ICD-10-CM

## 2024-11-14 DIAGNOSIS — E66.01 CLASS 3 SEVERE OBESITY DUE TO EXCESS CALORIES WITHOUT SERIOUS COMORBIDITY WITH BODY MASS INDEX (BMI) OF 40.0 TO 44.9 IN ADULT: ICD-10-CM

## 2024-11-14 PROCEDURE — 3079F DIAST BP 80-89 MM HG: CPT | Performed by: NURSE PRACTITIONER

## 2024-11-14 PROCEDURE — 99214 OFFICE O/P EST MOD 30 MIN: CPT | Performed by: NURSE PRACTITIONER

## 2024-11-14 PROCEDURE — 3077F SYST BP >= 140 MM HG: CPT | Performed by: NURSE PRACTITIONER

## 2024-11-14 RX ORDER — PSEUDOEPHEDRINE HCL 30 MG/1
30 TABLET, FILM COATED ORAL 2 TIMES DAILY
Qty: 60 TABLET | Refills: 1 | Status: SHIPPED | OUTPATIENT
Start: 2024-11-14 | End: 2025-11-14

## 2024-11-14 RX ORDER — FLUTICASONE PROPIONATE 50 MCG
2 SPRAY, SUSPENSION (ML) NASAL DAILY
Qty: 16 G | Refills: 0 | Status: SHIPPED | OUTPATIENT
Start: 2024-11-14

## 2024-11-14 ASSESSMENT — ENCOUNTER SYMPTOMS
TROUBLE SWALLOWING: 0
DIARRHEA: 0
RHINORRHEA: 0
NAUSEA: 0
COUGH: 0
SHORTNESS OF BREATH: 0
CONSTIPATION: 0
SORE THROAT: 0
VOMITING: 0
ABDOMINAL PAIN: 0

## 2024-11-14 NOTE — PROGRESS NOTES
Jericho Alexander (:  1980) is a 44 y.o. male,Established patient, here for evaluation of the following chief complaint(s):  Follow-up (Thinks has fluid on right ear. Has had continuous cough since having covid in sept. )      ASSESSMENT/PLAN:    ICD-10-CM    1. Dysfunction of right eustachian tube  H69.91 pseudoephedrine (DECONGESTANT) 30 MG tablet     fluticasone (FLONASE) 50 MCG/ACT nasal spray      2. Primary hypertension  I10 The current medical regimen is effective;  continue present plan and medications.        3. Class 3 severe obesity due to excess calories without serious comorbidity with body mass index (BMI) of 40.0 to 44.9 in adult  E66.813 Discussed weight loss options.     E66.01     Z68.41       4. Recurrent major depressive disorder, in full remission (HCC)  F33.42 The current medical regimen is effective;  continue present plan and medications.            Return in about 6 months (around 2025) for yearly physical.    SUBJECTIVE/OBJECTIVE:  HPI  Here for follow up     Hypertension:                Lisinopril   Home blood pressure monitoring: No.  He is not adherent to a low sodium diet. Patient denies chest pain, shortness of breath, and headache.  Antihypertensive medication side effects: no medication side effects noted.  Use of agents associated with hypertension: none.                            Obesity  Weight has 8 lbs since last visit 2023  Have not had drink in 3 weeks, thought about the calories with the alcohol.      Mood Disorder:    Medications:              Prozac- .   depression. Current complaints include: much better going to counseling every few weeks.  He denies any other symptoms. Symptoms/signs of arnoldo: none.  External stressors: yesterday 1 year anniversary of father's death. Medication side effects: none. Denies SI.   Have not had a drink in 3 weeks.      Daytime drowsiness  Wife sleeps with headphones, she has recorded me snoring  Tired during the

## 2024-12-23 DIAGNOSIS — I10 ESSENTIAL HYPERTENSION: ICD-10-CM

## 2024-12-23 RX ORDER — LISINOPRIL 20 MG/1
20 TABLET ORAL DAILY
Qty: 90 TABLET | Refills: 3 | Status: SHIPPED | OUTPATIENT
Start: 2024-12-23

## 2024-12-23 NOTE — TELEPHONE ENCOUNTER
Received fax from pharmacy requesting refill on pts medication(s). Pt was last seen in office on 11/14/2024  and has a follow up scheduled for 5/14/2025. Will send request to  Anoop Morales  for authorization.     Requested Prescriptions     Pending Prescriptions Disp Refills    lisinopril (PRINIVIL;ZESTRIL) 20 MG tablet [Pharmacy Med Name: LISINOPRIL 20MG TABS] 90 tablet 3     Sig: TAKE ONE TABLET BY MOUTH EVERY DAY

## 2025-01-19 DIAGNOSIS — Z13.31 POSITIVE DEPRESSION SCREENING: ICD-10-CM

## 2025-01-19 DIAGNOSIS — F33.0 MILD EPISODE OF RECURRENT MAJOR DEPRESSIVE DISORDER (HCC): ICD-10-CM

## 2025-01-20 RX ORDER — FLUOXETINE 40 MG/1
40 CAPSULE ORAL DAILY
Qty: 90 CAPSULE | Refills: 3 | Status: SHIPPED | OUTPATIENT
Start: 2025-01-20

## 2025-01-20 NOTE — TELEPHONE ENCOUNTER
Received fax from pharmacy requesting refill on pts medication(s). Pt was last seen in office on 11/14/2024  and has a follow up scheduled for Visit date not found. Will send request to  Anoop Morales  for patient.     Requested Prescriptions     Pending Prescriptions Disp Refills    FLUoxetine (PROZAC) 40 MG capsule [Pharmacy Med Name: FLUOXETINE HCL 40MG CAPS] 90 capsule 3     Sig: TAKE ONE CAPSULE BY MOUTH EVERY DAY

## 2025-05-07 ENCOUNTER — OFFICE VISIT (OUTPATIENT)
Age: 45
End: 2025-05-07

## 2025-05-07 VITALS
RESPIRATION RATE: 16 BRPM | WEIGHT: 276 LBS | SYSTOLIC BLOOD PRESSURE: 128 MMHG | BODY MASS INDEX: 40.76 KG/M2 | OXYGEN SATURATION: 97 % | DIASTOLIC BLOOD PRESSURE: 88 MMHG | TEMPERATURE: 98.8 F | HEART RATE: 97 BPM
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
## 2025-05-07 RX ORDER — MUPIROCIN 20 MG/G
OINTMENT TOPICAL
Qty: 1 G | Refills: 0 | Status: SHIPPED | OUTPATIENT
Start: 2025-05-07 | End: 2025-05-14

## 2025-05-07 ASSESSMENT — ENCOUNTER SYMPTOMS
SORE THROAT: 0
EYE PAIN: 0
TROUBLE SWALLOWING: 0
SINUS PRESSURE: 0
SHORTNESS OF BREATH: 0
WHEEZING: 0
CHEST TIGHTNESS: 0
ABDOMINAL DISTENTION: 0
ABDOMINAL PAIN: 0
COLOR CHANGE: 0
EYE DISCHARGE: 0
COUGH: 0
STRIDOR: 0

## 2025-05-07 NOTE — PROGRESS NOTES
[facility], [facility] ([place])  [facility]  [address]  [address]  [place] [address]  Dept: [phone]    Jericho Alexander is a 44 y.o. male who presents today for his medical conditions/complaints as noted below.  Jericho Alexander is complaining of Dog Bite (There is a visible open wound on pt's left forearm and r pointer finger. Bleeding from wound is minimal and there is no visible signs of infection.  Pt states that his two dogs were fighting this afternoon and he states that when he went to separate the dogs, both of them bit him. Pt reports that he is unaware when his last tetanus shot was.)        HPI:   HPI   Jericho presents to the office complaining of dog bites to his left forearm and right index finger.  Injury occurred today by two separate large-breed dogs.  Dogs belong to patient and is fully vaccinated.  TDAP status unknown.  Denies numbness/tingling or loss of ROM.   Past Medical History:   Diagnosis Date    Depression     Hypertension        No past surgical history on file.    Family History   Problem Relation Age of Onset    Other Mother         myasthenia gravis    Heart Disease Father     High Blood Pressure Father     High Cholesterol Father     Coronary Art Dis Father     Diabetes Father     Heart Attack Father     Hypertension Sister     Asthma Sister     Crohn's Disease Brother     Crohn's Disease Maternal Grandmother     Coronary Art Dis Maternal Grandmother     Inflam Bowel Dis Maternal Grandmother     Stroke Maternal Grandfather        Social History     Tobacco Use    Smoking status: Former    Smokeless tobacco: Never   Substance Use Topics    Alcohol use: Not Currently     Alcohol/week: 4.0 standard drinks of alcohol     Types: 2 Cans of beer, 2 Shots of liquor per week        Current Outpatient Medications   Medication Sig Dispense Refill    amoxicillin-clavulanate (AUGMENTIN) 875-125 MG per tablet Take 1 tablet by mouth 2 times daily for 10

## 2025-05-07 NOTE — PATIENT INSTRUCTIONS
TDAP updated  Wound cleaned and dressed  Bactroban and Augmentin sent  Keep clean and dry  Change soiled dressings often.  Leave open to air when bleeding controlled and patient is in clean environment  Dog bite HD form filled out.   Follow-up with PCP as needed  Go to ER for worrisome symptoms    Patient verbalized understandings and agrees to plan.

## 2025-05-21 ENCOUNTER — NURSE ONLY (OUTPATIENT)
Age: 45
End: 2025-05-21

## 2025-05-21 DIAGNOSIS — Z13.9 SCREENING DUE: Primary | ICD-10-CM

## 2025-05-21 PROCEDURE — NBSRV NON-BILLABLE SERVICE: Performed by: NURSE PRACTITIONER

## 2025-05-21 SDOH — ECONOMIC STABILITY: FOOD INSECURITY: WITHIN THE PAST 12 MONTHS, YOU WORRIED THAT YOUR FOOD WOULD RUN OUT BEFORE YOU GOT MONEY TO BUY MORE.: NEVER TRUE

## 2025-05-21 SDOH — ECONOMIC STABILITY: FOOD INSECURITY: WITHIN THE PAST 12 MONTHS, THE FOOD YOU BOUGHT JUST DIDN'T LAST AND YOU DIDN'T HAVE MONEY TO GET MORE.: NEVER TRUE

## 2025-05-21 NOTE — PROGRESS NOTES
[facility] [employer] ([employer]) Screening Questionnaire    Question 5/20/2025  3:58 PM CDT - Filed by Patient   Within the past 12 months, you worried that your food would run out before you got the money to buy more. Never true   Within the past 12 months, the food you bought just didn't last and you didn't have money to get more. Never true   In the past 12 months, has lack of transportation kept you from medical appointments or from getting medications? No   In the past 12 months, has lack of transportation kept you from meetings, work, or from getting things needed for daily living? No   In the last 12 months, was there a time when you were not able to pay the mortgage or rent on time? No   In the past 12 months, how many times have you moved where you were living? 0   At any time in the past 12 months, were you homeless or living in a shelter (including now)? No   In the past 12 months has the electric, gas, oil, or water company threatened to shut off services in your home? No   Would you like resources for any of these topics? No, thank you

## 2025-06-16 ASSESSMENT — PATIENT HEALTH QUESTIONNAIRE - PHQ9
3. TROUBLE FALLING OR STAYING ASLEEP: NOT AT ALL
10. IF YOU CHECKED OFF ANY PROBLEMS, HOW DIFFICULT HAVE THESE PROBLEMS MADE IT FOR YOU TO DO YOUR WORK, TAKE CARE OF THINGS AT HOME, OR GET ALONG WITH OTHER PEOPLE: VERY DIFFICULT
6. FEELING BAD ABOUT YOURSELF - OR THAT YOU ARE A FAILURE OR HAVE LET YOURSELF OR YOUR FAMILY DOWN: NOT AT ALL
1. LITTLE INTEREST OR PLEASURE IN DOING THINGS: SEVERAL DAYS
4. FEELING TIRED OR HAVING LITTLE ENERGY: MORE THAN HALF THE DAYS
4. FEELING TIRED OR HAVING LITTLE ENERGY: MORE THAN HALF THE DAYS
10. IF YOU CHECKED OFF ANY PROBLEMS, HOW DIFFICULT HAVE THESE PROBLEMS MADE IT FOR YOU TO DO YOUR WORK, TAKE CARE OF THINGS AT HOME, OR GET ALONG WITH OTHER PEOPLE: VERY DIFFICULT
SUM OF ALL RESPONSES TO PHQ QUESTIONS 1-9: 9
5. POOR APPETITE OR OVEREATING: MORE THAN HALF THE DAYS
3. TROUBLE FALLING OR STAYING ASLEEP: NOT AT ALL
8. MOVING OR SPEAKING SO SLOWLY THAT OTHER PEOPLE COULD HAVE NOTICED. OR THE OPPOSITE - BEING SO FIDGETY OR RESTLESS THAT YOU HAVE BEEN MOVING AROUND A LOT MORE THAN USUAL: NOT AT ALL
SUM OF ALL RESPONSES TO PHQ QUESTIONS 1-9: 9
2. FEELING DOWN, DEPRESSED OR HOPELESS: SEVERAL DAYS
6. FEELING BAD ABOUT YOURSELF - OR THAT YOU ARE A FAILURE OR HAVE LET YOURSELF OR YOUR FAMILY DOWN: NOT AT ALL
2. FEELING DOWN, DEPRESSED OR HOPELESS: SEVERAL DAYS
7. TROUBLE CONCENTRATING ON THINGS, SUCH AS READING THE NEWSPAPER OR WATCHING TELEVISION: NEARLY EVERY DAY
5. POOR APPETITE OR OVEREATING: MORE THAN HALF THE DAYS
9. THOUGHTS THAT YOU WOULD BE BETTER OFF DEAD, OR OF HURTING YOURSELF: NOT AT ALL
SUM OF ALL RESPONSES TO PHQ QUESTIONS 1-9: 9
1. LITTLE INTEREST OR PLEASURE IN DOING THINGS: SEVERAL DAYS
7. TROUBLE CONCENTRATING ON THINGS, SUCH AS READING THE NEWSPAPER OR WATCHING TELEVISION: NEARLY EVERY DAY
8. MOVING OR SPEAKING SO SLOWLY THAT OTHER PEOPLE COULD HAVE NOTICED. OR THE OPPOSITE, BEING SO FIGETY OR RESTLESS THAT YOU HAVE BEEN MOVING AROUND A LOT MORE THAN USUAL: NOT AT ALL
9. THOUGHTS THAT YOU WOULD BE BETTER OFF DEAD, OR OF HURTING YOURSELF: NOT AT ALL
SUM OF ALL RESPONSES TO PHQ QUESTIONS 1-9: 9
SUM OF ALL RESPONSES TO PHQ QUESTIONS 1-9: 9

## 2025-06-19 ENCOUNTER — RESULTS FOLLOW-UP (OUTPATIENT)
Age: 45
End: 2025-06-19

## 2025-06-19 ENCOUNTER — OFFICE VISIT (OUTPATIENT)
Age: 45
End: 2025-06-19
Payer: COMMERCIAL

## 2025-06-19 VITALS
SYSTOLIC BLOOD PRESSURE: 120 MMHG | HEIGHT: 69 IN | HEART RATE: 69 BPM | DIASTOLIC BLOOD PRESSURE: 80 MMHG | OXYGEN SATURATION: 98 % | WEIGHT: 283.13 LBS | BODY MASS INDEX: 41.94 KG/M2 | TEMPERATURE: 96.9 F

## 2025-06-19 DIAGNOSIS — E66.813 CLASS 3 SEVERE OBESITY DUE TO EXCESS CALORIES WITHOUT SERIOUS COMORBIDITY WITH BODY MASS INDEX (BMI) OF 40.0 TO 44.9 IN ADULT (HCC): ICD-10-CM
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
## 2025-06-19 DIAGNOSIS — I10 ESSENTIAL HYPERTENSION: ICD-10-CM

## 2025-06-19 DIAGNOSIS — Z00.00 ROUTINE PHYSICAL EXAMINATION: Primary | ICD-10-CM

## 2025-06-19 DIAGNOSIS — F33.0 MILD EPISODE OF RECURRENT MAJOR DEPRESSIVE DISORDER: ICD-10-CM

## 2025-06-19 DIAGNOSIS — Z00.00 ROUTINE PHYSICAL EXAMINATION: ICD-10-CM

## 2025-06-19 DIAGNOSIS — E78.00 PURE HYPERCHOLESTEROLEMIA: Primary | ICD-10-CM

## 2025-06-19 LAB
ALBUMIN SERPL-MCNC: 4.4 G/DL (ref 3.5–5.2)
ALP SERPL-CCNC: 98 U/L (ref 40–129)
ALT SERPL-CCNC: 37 U/L (ref 10–50)
ANION GAP SERPL CALCULATED.3IONS-SCNC: 12 MMOL/L (ref 8–16)
AST SERPL-CCNC: 28 U/L (ref 10–50)
BASOPHILS # BLD: 0.1 K/UL (ref 0–0.2)
BASOPHILS NFR BLD: 1.3 % (ref 0–1)
BILIRUB SERPL-MCNC: 0.3 MG/DL (ref 0.2–1.2)
BUN SERPL-MCNC: 14 MG/DL (ref 6–20)
CALCIUM SERPL-MCNC: 9.4 MG/DL (ref 8.6–10)
CHLORIDE SERPL-SCNC: 105 MMOL/L (ref 98–107)
CHOLEST SERPL-MCNC: 210 MG/DL (ref 0–199)
CO2 SERPL-SCNC: 21 MMOL/L (ref 22–29)
CREAT SERPL-MCNC: 0.9 MG/DL (ref 0.7–1.2)
CREAT UR-MCNC: 34.1 MG/DL (ref 39–259)
EOSINOPHIL # BLD: 0.4 K/UL (ref 0–0.6)
EOSINOPHIL NFR BLD: 6.7 % (ref 0–5)
ERYTHROCYTE [DISTWIDTH] IN BLOOD BY AUTOMATED COUNT: 14.1 % (ref 11.5–14.5)
GLUCOSE SERPL-MCNC: 105 MG/DL (ref 70–99)
HCT VFR BLD AUTO: 41.5 % (ref 42–52)
HDLC SERPL-MCNC: 53 MG/DL (ref 40–60)
HGB BLD-MCNC: 14.5 G/DL (ref 14–18)
IMM GRANULOCYTES # BLD: 0 K/UL
LDLC SERPL CALC-MCNC: 131 MG/DL
LYMPHOCYTES # BLD: 2.2 K/UL (ref 1.1–4.5)
LYMPHOCYTES NFR BLD: 37.2 % (ref 20–40)
MCH RBC QN AUTO: 32.1 PG (ref 27–31)
MCHC RBC AUTO-ENTMCNC: 34.9 G/DL (ref 33–37)
MCV RBC AUTO: 91.8 FL (ref 80–94)
MICROALBUMIN UR-MCNC: <1.2 MG/DL (ref 0–1.99)
MICROALBUMIN/CREAT UR-RTO: ABNORMAL MG/G (ref 0–29)
MONOCYTES # BLD: 0.5 K/UL (ref 0–0.9)
MONOCYTES NFR BLD: 8 % (ref 0–10)
NEUTROPHILS # BLD: 2.8 K/UL (ref 1.5–7.5)
NEUTS SEG NFR BLD: 46.5 % (ref 50–65)
PLATELET # BLD AUTO: 301 K/UL (ref 130–400)
PMV BLD AUTO: 10.7 FL (ref 9.4–12.4)
POTASSIUM SERPL-SCNC: 4.1 MMOL/L (ref 3.5–5.1)
PROT SERPL-MCNC: 7.1 G/DL (ref 6.4–8.3)
RBC # BLD AUTO: 4.52 M/UL (ref 4.7–6.1)
SODIUM SERPL-SCNC: 138 MMOL/L (ref 136–145)
T4 FREE SERPL-MCNC: 0.98 NG/DL (ref 0.93–1.7)
TRIGL SERPL-MCNC: 130 MG/DL (ref 0–149)
TSH SERPL DL<=0.005 MIU/L-ACNC: 1.12 UIU/ML (ref 0.27–4.2)
WBC # BLD AUTO: 6 K/UL (ref 4.8–10.8)

## 2025-06-19 PROCEDURE — 3079F DIAST BP 80-89 MM HG: CPT | Performed by: NURSE PRACTITIONER

## 2025-06-19 PROCEDURE — 3074F SYST BP LT 130 MM HG: CPT | Performed by: NURSE PRACTITIONER

## 2025-06-19 PROCEDURE — 99396 PREV VISIT EST AGE 40-64: CPT | Performed by: NURSE PRACTITIONER

## 2025-06-19 RX ORDER — ATORVASTATIN CALCIUM 20 MG/1
20 TABLET, FILM COATED ORAL DAILY
Qty: 30 TABLET | Refills: 5 | Status: SHIPPED | OUTPATIENT
Start: 2025-06-19

## 2025-06-19 SDOH — ECONOMIC STABILITY: FOOD INSECURITY: WITHIN THE PAST 12 MONTHS, YOU WORRIED THAT YOUR FOOD WOULD RUN OUT BEFORE YOU GOT MONEY TO BUY MORE.: NEVER TRUE

## 2025-06-19 SDOH — ECONOMIC STABILITY: FOOD INSECURITY: WITHIN THE PAST 12 MONTHS, THE FOOD YOU BOUGHT JUST DIDN'T LAST AND YOU DIDN'T HAVE MONEY TO GET MORE.: NEVER TRUE

## 2025-06-19 ASSESSMENT — ENCOUNTER SYMPTOMS
SHORTNESS OF BREATH: 0
COUGH: 0
TROUBLE SWALLOWING: 0
ABDOMINAL PAIN: 0
RHINORRHEA: 0
VOMITING: 0
CONSTIPATION: 0
DIARRHEA: 0
NAUSEA: 0
SORE THROAT: 0

## 2025-06-19 NOTE — PROGRESS NOTES
2025    Jericho Alexander (:  1980) is a 44 y.o. male, here for a preventive medicine evaluation.       Assessment & Plan  1. Hypertension: Stable.  - Blood pressure readings are within the normal range at 120/80.  - Continue current medication regimen with lisinopril 20 mg daily.    2. Hyperlipidemia.  - Cholesterol levels were slightly elevated during the previous year's assessment.  - Increased concern due to family history of heart disease.  - Comprehensive set of laboratory tests will be ordered today.  - If cholesterol levels remain elevated, pharmacological intervention will be considered.    3. Weight management.  - Weight has shown a decrease followed by an increase, with the highest recorded weight being 290 pounds.  - Discussed potential use of weight loss medications such as Wegovy and Zepbound, considering insurance coverage and cost.  - Encouraged to continue with dietary modifications, regular exercise, and overall lifestyle changes.    4. Dog bite injury.  - Injury does not exhibit signs of infection.  - Advised to soak the affected area in hot water several times daily.  - Recommended taking anti-inflammatory medications such as ibuprofen or Aleve twice daily.    Follow-up  - Blood work to assess cholesterol levels.  - If cholesterol levels remain elevated, pharmacological intervention will be considered.  - Return in 3 months to reassess cholesterol levels.      ICD-10-CM    1. Routine physical examination  Z00.00 CBC with Auto Differential     Comprehensive Metabolic Panel     TSH     T4, Free     Albumin/Creatinine Ratio, Urine     Lipid Panel      2. Essential hypertension  I10 CBC with Auto Differential     Comprehensive Metabolic Panel     Albumin/Creatinine Ratio, Urine     Lipid Panel      3. Class 3 severe obesity due to excess calories without serious comorbidity with body mass index (BMI) of 40.0 to 44.9 in adult ([facility])  E66.813     Z68.41       4. Mild episode of

## 2025-07-09 DIAGNOSIS — I10 ESSENTIAL HYPERTENSION: ICD-10-CM

## 2025-07-09 DIAGNOSIS — E78.00 PURE HYPERCHOLESTEROLEMIA: ICD-10-CM

## 2025-07-10 RX ORDER — ATORVASTATIN CALCIUM 20 MG/1
20 TABLET, FILM COATED ORAL DAILY
Qty: 90 TABLET | Refills: 3 | Status: SHIPPED | OUTPATIENT
Start: 2025-07-10

## 2025-07-10 NOTE — TELEPHONE ENCOUNTER
Received fax from pharmacy requesting refill on pts medication(s). Pt was last seen in office on 6/19/2025  and has a follow up scheduled for Visit date not found. Will send request to  Anoop Morales  for patient.     Requested Prescriptions     Pending Prescriptions Disp Refills    atorvastatin (LIPITOR) 20 MG tablet 30 tablet 5     Sig: Take 1 tablet by mouth daily